# Patient Record
Sex: FEMALE | Employment: UNEMPLOYED | URBAN - METROPOLITAN AREA
[De-identification: names, ages, dates, MRNs, and addresses within clinical notes are randomized per-mention and may not be internally consistent; named-entity substitution may affect disease eponyms.]

---

## 2021-05-18 ENCOUNTER — OFFICE VISIT (OUTPATIENT)
Dept: GASTROENTEROLOGY | Age: 51
End: 2021-05-18
Payer: COMMERCIAL

## 2021-05-18 VITALS
OXYGEN SATURATION: 98 % | HEART RATE: 85 BPM | DIASTOLIC BLOOD PRESSURE: 70 MMHG | TEMPERATURE: 97.1 F | BODY MASS INDEX: 28 KG/M2 | SYSTOLIC BLOOD PRESSURE: 106 MMHG | HEIGHT: 63 IN | RESPIRATION RATE: 12 BRPM | WEIGHT: 158 LBS

## 2021-05-18 DIAGNOSIS — R10.9 ABDOMINAL PAIN, UNSPECIFIED ABDOMINAL LOCATION: Primary | ICD-10-CM

## 2021-05-18 PROCEDURE — 99204 OFFICE O/P NEW MOD 45 MIN: CPT | Performed by: INTERNAL MEDICINE

## 2021-05-18 RX ORDER — DIVALPROEX SODIUM 500 MG/1
TABLET, DELAYED RELEASE ORAL
COMMUNITY
Start: 2021-04-27

## 2021-05-18 RX ORDER — CYCLOBENZAPRINE HCL 10 MG
TABLET ORAL
COMMUNITY
Start: 2021-05-14

## 2021-05-18 RX ORDER — BUPROPION HYDROCHLORIDE 150 MG/1
TABLET ORAL
COMMUNITY
Start: 2021-04-27

## 2021-05-18 RX ORDER — SODIUM, POTASSIUM,MAG SULFATES 17.5-3.13G
SOLUTION, RECONSTITUTED, ORAL ORAL
Qty: 1 BOTTLE | Refills: 0 | Status: SHIPPED | OUTPATIENT
Start: 2021-05-18 | End: 2021-07-20

## 2021-05-18 RX ORDER — LISINOPRIL 5 MG/1
TABLET ORAL
COMMUNITY
Start: 2021-05-11

## 2021-05-18 RX ORDER — POLYETHYLENE GLYCOL 3350 17 G/17G
17 POWDER, FOR SOLUTION ORAL DAILY
Qty: 510 G | Refills: 3 | Status: SHIPPED | OUTPATIENT
Start: 2021-05-18 | End: 2021-06-17

## 2021-05-18 RX ORDER — GABAPENTIN 800 MG/1
TABLET ORAL
COMMUNITY
Start: 2021-04-24

## 2021-05-18 ASSESSMENT — ENCOUNTER SYMPTOMS
ABDOMINAL PAIN: 1
CHEST TIGHTNESS: 0
COLOR CHANGE: 0
PHOTOPHOBIA: 0
VOICE CHANGE: 0
RECTAL PAIN: 0
EYE PAIN: 0
BLOOD IN STOOL: 0
TROUBLE SWALLOWING: 0
SHORTNESS OF BREATH: 0
VOMITING: 0
CONSTIPATION: 1
ABDOMINAL DISTENTION: 0
DIARRHEA: 0
NAUSEA: 0
EYE REDNESS: 0
WHEEZING: 0

## 2021-05-18 NOTE — PROGRESS NOTES
Subjective:      Patient ID: Liu Pearson is a 45 y.o. female who presents today for:  Chief Complaint   Patient presents with    Abdominal Pain    Constipation       HPI  Is very pleasant Armenian-speaking female coming in today for the initial visit. Patient mentioned that she has been having constipation for a long time associated with bloating and fullness. Denies previous colonoscopy. Reports family history of CRC though not clear. Denies any hematemesis melena hematochezia. No weight loss reported. Patient also reports family history of cirrhosis in sister mother and other family members does not recall the etiology of cirrhosis. Patient mentioned that alcohol was not involved. Does not recall if there were history of viral hepatitis. Otherwise given the longstanding history of constipation associated abdominal pain and the family history of liver diseases patient came in today for further evaluation management  No past medical history on file. No past surgical history on file. Social History     Socioeconomic History    Marital status: Unknown     Spouse name: Not on file    Number of children: Not on file    Years of education: Not on file    Highest education level: Not on file   Occupational History    Not on file   Tobacco Use    Smoking status: Never Smoker    Smokeless tobacco: Never Used   Substance and Sexual Activity    Alcohol use: Not on file    Drug use: Not on file    Sexual activity: Not on file   Other Topics Concern    Not on file   Social History Narrative    Not on file     Social Determinants of Health     Financial Resource Strain:     Difficulty of Paying Living Expenses:    Food Insecurity:     Worried About Running Out of Food in the Last Year:     [address] in the Last Year:    Transportation Needs:     Lack of Transportation (Medical):      Lack of Transportation (Non-Medical):    Physical Activity:     Days of Exercise per Week:     Minutes of Exercise per Session:    Stress:     Feeling of Stress :    Social Connections:     Frequency of Communication with Friends and Family:     Frequency of Social Gatherings with Friends and Family:     Attends [employer] Services:     Active Member of Clubs or Organizations:     Attends Club or Organization Meetings:     Marital Status:    Intimate Partner Violence:     Fear of Current or Ex-Partner:     Emotionally Abused:     Physically Abused:     Sexually Abused:      No family history on file. No Known Allergies      Review of Systems   Constitutional: Negative for appetite change, chills, fatigue, fever and unexpected weight change. HENT: Negative for nosebleeds, tinnitus, trouble swallowing and voice change. Eyes: Negative for photophobia, pain and redness. Respiratory: Negative for chest tightness, shortness of breath and wheezing. Cardiovascular: Negative for chest pain, palpitations and leg swelling. Gastrointestinal: Positive for abdominal pain and constipation. Negative for abdominal distention, blood in stool, diarrhea, nausea, rectal pain and vomiting. Endocrine: Negative for polydipsia, polyphagia and polyuria. Genitourinary: Negative for difficulty urinating and hematuria. Skin: Negative for color change, pallor and rash. Neurological: Negative for dizziness, speech difficulty and headaches. Psychiatric/Behavioral: Negative for confusion and suicidal ideas. Objective:   /70 (Site: Right Upper Arm, Position: Sitting, Cuff Size: Small Adult)   Pulse 85   Temp 97.1 °F (36.2 °C)   Resp 12   Ht 5' 3\" (1.6 m)   Wt 158 lb (71.7 kg)   SpO2 98%   BMI 27.99 kg/m²     Physical Exam  Constitutional:       General: She is not in acute distress. Appearance: She is well-developed. HENT:      Head: Normocephalic and atraumatic. Eyes:      Conjunctiva/sclera: Conjunctivae normal.      Pupils: Pupils are equal, round, and reactive to light.    Cardiovascular:

## 2021-05-20 DIAGNOSIS — R10.9 ABDOMINAL PAIN, UNSPECIFIED ABDOMINAL LOCATION: ICD-10-CM

## 2021-05-20 LAB
ALBUMIN SERPL-MCNC: 4.1 G/DL (ref 3.5–4.6)
ALP BLD-CCNC: 87 U/L (ref 40–130)
ALT SERPL-CCNC: 12 U/L (ref 0–33)
ANION GAP SERPL CALCULATED.3IONS-SCNC: 12 MEQ/L (ref 9–15)
AST SERPL-CCNC: 14 U/L (ref 0–35)
BILIRUB SERPL-MCNC: <0.2 MG/DL (ref 0.2–0.7)
BUN BLDV-MCNC: 13 MG/DL (ref 6–20)
CALCIUM SERPL-MCNC: 9.3 MG/DL (ref 8.5–9.9)
CHLORIDE BLD-SCNC: 103 MEQ/L (ref 95–107)
CO2: 27 MEQ/L (ref 20–31)
CREAT SERPL-MCNC: 0.82 MG/DL (ref 0.5–0.9)
GFR AFRICAN AMERICAN: >60
GFR NON-AFRICAN AMERICAN: >60
GLOBULIN: 2.1 G/DL (ref 2.3–3.5)
GLUCOSE BLD-MCNC: 92 MG/DL (ref 70–99)
HBV SURFACE AB TITR SER: NORMAL MIU/ML
HCT VFR BLD CALC: 38.5 % (ref 37–47)
HEMOGLOBIN: 13 G/DL (ref 12–16)
HEPATITIS B SURFACE ANTIGEN INTERPRETATION: NORMAL
HEPATITIS C ANTIBODY INTERPRETATION: NORMAL
INR BLD: 1
MCH RBC QN AUTO: 32.1 PG (ref 27–31.3)
MCHC RBC AUTO-ENTMCNC: 33.7 % (ref 33–37)
MCV RBC AUTO: 95.5 FL (ref 82–100)
PDW BLD-RTO: 13.1 % (ref 11.5–14.5)
PLATELET # BLD: 184 K/UL (ref 130–400)
POTASSIUM SERPL-SCNC: 4.7 MEQ/L (ref 3.4–4.9)
PROTHROMBIN TIME: 13.4 SEC (ref 12.3–14.9)
RBC # BLD: 4.04 M/UL (ref 4.2–5.4)
SODIUM BLD-SCNC: 142 MEQ/L (ref 135–144)
TOTAL PROTEIN: 6.2 G/DL (ref 6.3–8)
WBC # BLD: 5.6 K/UL (ref 4.8–10.8)

## 2021-05-22 LAB
HAV AB SERPL IA-ACNC: NEGATIVE
HEPATITIS B CORE TOTAL ANTIBODY: NEGATIVE

## 2021-05-24 ENCOUNTER — TELEPHONE (OUTPATIENT)
Dept: GASTROENTEROLOGY | Age: 51
End: 2021-05-24

## 2021-05-24 ENCOUNTER — ANESTHESIA EVENT (OUTPATIENT)
Dept: OPERATING ROOM | Age: 51
End: 2021-05-24
Payer: COMMERCIAL

## 2021-05-24 NOTE — TELEPHONE ENCOUNTER
Patient has called back stating they called the pharmacy and now they pharmacy is telling them the doctor needs to call the insurance company? Can you call them back to get this resolved? The procedure is scheduled for tomorrow.

## 2021-05-25 ENCOUNTER — ANESTHESIA (OUTPATIENT)
Dept: OPERATING ROOM | Age: 51
End: 2021-05-25
Payer: COMMERCIAL

## 2021-05-25 ENCOUNTER — ANCILLARY PROCEDURE (OUTPATIENT)
Dept: ENDOSCOPY | Age: 51
End: 2021-05-25
Attending: INTERNAL MEDICINE
Payer: COMMERCIAL

## 2021-05-25 ENCOUNTER — HOSPITAL ENCOUNTER (OUTPATIENT)
Age: 51
Setting detail: OUTPATIENT SURGERY
Discharge: HOME | End: 2021-05-25
Attending: INTERNAL MEDICINE | Admitting: INTERNAL MEDICINE
Payer: COMMERCIAL

## 2021-05-25 VITALS
DIASTOLIC BLOOD PRESSURE: 75 MMHG | HEART RATE: 81 BPM | BODY MASS INDEX: 26.75 KG/M2 | OXYGEN SATURATION: 96 % | RESPIRATION RATE: 16 BRPM | TEMPERATURE: 97.5 F | WEIGHT: 151 LBS | HEIGHT: 63 IN | SYSTOLIC BLOOD PRESSURE: 129 MMHG

## 2021-05-25 PROCEDURE — 2580000003 HC RX 258: Performed by: INTERNAL MEDICINE

## 2021-05-25 PROCEDURE — 6370000000 HC RX 637 (ALT 250 FOR IP): Performed by: INTERNAL MEDICINE

## 2021-05-25 RX ORDER — MAGNESIUM HYDROXIDE 1200 MG/15ML
LIQUID ORAL PRN
Status: DISCONTINUED | OUTPATIENT
Start: 2021-05-25 | End: 2021-05-25 | Stop reason: ALTCHOICE

## 2021-05-25 RX ORDER — SODIUM CHLORIDE 9 MG/ML
INJECTION, SOLUTION INTRAVENOUS CONTINUOUS
Status: CANCELLED | OUTPATIENT
Start: 2021-05-25

## 2021-05-25 RX ORDER — SODIUM CHLORIDE 9 MG/ML
25 INJECTION, SOLUTION INTRAVENOUS PRN
Status: CANCELLED | OUTPATIENT
Start: 2021-05-25

## 2021-05-25 RX ORDER — 0.9 % SODIUM CHLORIDE 0.9 %
500 INTRAVENOUS SOLUTION INTRAVENOUS ONCE
Status: COMPLETED | OUTPATIENT
Start: 2021-05-25 | End: 2021-05-25

## 2021-05-25 RX ORDER — LIDOCAINE HYDROCHLORIDE 10 MG/ML
1 INJECTION, SOLUTION EPIDURAL; INFILTRATION; INTRACAUDAL; PERINEURAL
Status: CANCELLED | OUTPATIENT
Start: 2021-05-25 | End: 2021-05-25

## 2021-05-25 RX ORDER — SODIUM CHLORIDE 9 MG/ML
INJECTION, SOLUTION INTRAVENOUS
Status: DISCONTINUED
Start: 2021-05-25 | End: 2021-05-25 | Stop reason: HOSPADM

## 2021-05-25 RX ORDER — SODIUM CHLORIDE 0.9 % (FLUSH) 0.9 %
5-40 SYRINGE (ML) INJECTION EVERY 12 HOURS SCHEDULED
Status: CANCELLED | OUTPATIENT
Start: 2021-05-25

## 2021-05-25 RX ORDER — ONDANSETRON 2 MG/ML
4 INJECTION INTRAMUSCULAR; INTRAVENOUS
Status: DISCONTINUED | OUTPATIENT
Start: 2021-05-25 | End: 2021-05-25 | Stop reason: HOSPADM

## 2021-05-25 RX ORDER — SODIUM CHLORIDE 0.9 % (FLUSH) 0.9 %
5-40 SYRINGE (ML) INJECTION PRN
Status: CANCELLED | OUTPATIENT
Start: 2021-05-25

## 2021-05-25 RX ADMIN — SODIUM CHLORIDE 500 ML: 9 INJECTION, SOLUTION INTRAVENOUS at 08:01

## 2021-05-25 ASSESSMENT — PAIN - FUNCTIONAL ASSESSMENT: PAIN_FUNCTIONAL_ASSESSMENT: 0-10

## 2021-05-25 NOTE — H&P
Patient Name: Ernestina Vail  : 1970  MRN: [id number]  DATE: 21      ENDOSCOPY  History and Physical    Procedure:    [x] Diagnostic Colonoscopy       [] Screening Colonoscopy  [] EGD      [] ERCP      [] EUS       [] Other    [x] Previous office notes/History and Physical reviewed from the patients chart. Please see EMR for further details of HPI. I have examined the patient's status immediately prior to the procedure and:      Indications/HPI:    []Abdominal Pain   []Cancer- GI/Lung  []Fhx of colon CA/polyps  []History of Polyps   []Carltons   []Melena  []Abnormal Imaging   []Dysphagia    []Persistent Pneumonia  []Anemia   []Food Impaction  []History of Polyps  []GI Bleed   []Pulmonary nodule/Mass  []Change in bowel habits  []Heartburn/Reflux  []Rectal Bleed (BRBPR)  []Chest Pain - Non Cardiac  []Heme (+) Stool  []Ulcers  [x]Constipation   []Hemoptysis   []Varices  []Diarrhea   []Hypoxemia  []Nausea/Vomiting   []Screening   []Crohns/Colitis  []Other:    Anesthesia:   [x] MAC [] Moderate Sedation   [] General   [] None     ROS: 12 pt Review of Symptoms was negative unless mentioned above    Medications:   Prior to Admission medications    Medication Sig Start Date End Date Taking?  Authorizing Provider   cyclobenzaprine (FLEXERIL) 10 MG tablet  21   Historical Provider, MD   lisinopril (PRINIVIL;ZESTRIL) 5 MG tablet  21   Historical Provider, MD   gabapentin (NEURONTIN) 800 MG tablet  21   Historical Provider, MD   divalproex (DEPAKOTE) 500 MG DR tablet  21   Historical Provider, MD   buPROPion (WELLBUTRIN XL) 150 MG extended release tablet  21   Historical Provider, MD   Na Sulfate-K Sulfate-Mg Sulf 17.5-3.13-1.6 GM/177ML SOLN As directed 21   Enrique Roa MD   polyethylene glycol (MIRALAX) 17 GM/SCOOP powder Take 17 g by mouth daily 21  Enrique Roa MD       Allergies: No Known Allergies     History of allergic reaction to anesthesia: No    Past Medical History:  No past medical history on file. Past Surgical History:  No past surgical history on file. Social History:  Social History     Tobacco Use    Smoking status: Never Smoker    Smokeless tobacco: Never Used   Substance Use Topics    Alcohol use: Not on file    Drug use: Not on file       Vital Signs: There were no vitals filed for this visit. Physical Exam:  Cardiac:  [x]WNL  []Comments:  Pulmonary:  [x]WNL   []Comments:   Neuro/Mental Status:  [x]WNL  []Comments:  Abdominal:  [x]WNL    []Comments:  Other:   []WNL  []Comments:    Informed Consent:  The risks and benefits of the procedure have been discussed with either the patient or if they cannot consent, their representative. Assessment:  Patient examined and appropriate for planned sedation and procedure. Plan:  Proceed with planned sedation and procedure as above.     Kathryn Rodriguez MD  7:23 AM

## 2021-06-07 RX ORDER — POLYETHYLENE GLYCOL 3350, SODIUM CHLORIDE, SODIUM BICARBONATE, POTASSIUM CHLORIDE 420; 11.2; 5.72; 1.48 G/4L; G/4L; G/4L; G/4L
4000 POWDER, FOR SOLUTION ORAL ONCE
Qty: 4000 ML | Refills: 0 | Status: SHIPPED | OUTPATIENT
Start: 2021-06-07 | End: 2021-06-07

## 2021-06-08 ENCOUNTER — ANESTHESIA (OUTPATIENT)
Dept: ENDOSCOPY | Age: 51
End: 2021-06-08
Payer: COMMERCIAL

## 2021-06-08 ENCOUNTER — ANESTHESIA EVENT (OUTPATIENT)
Dept: ENDOSCOPY | Age: 51
End: 2021-06-08
Payer: COMMERCIAL

## 2021-06-08 ENCOUNTER — ANCILLARY PROCEDURE (OUTPATIENT)
Dept: ENDOSCOPY | Age: 51
End: 2021-06-08
Attending: INTERNAL MEDICINE
Payer: COMMERCIAL

## 2021-06-08 ENCOUNTER — HOSPITAL ENCOUNTER (OUTPATIENT)
Age: 51
Setting detail: OUTPATIENT SURGERY
Discharge: HOME | End: 2021-06-08
Attending: INTERNAL MEDICINE | Admitting: INTERNAL MEDICINE
Payer: COMMERCIAL

## 2021-06-08 VITALS
DIASTOLIC BLOOD PRESSURE: 63 MMHG | HEART RATE: 80 BPM | WEIGHT: 151 LBS | SYSTOLIC BLOOD PRESSURE: 107 MMHG | TEMPERATURE: 96.9 F | OXYGEN SATURATION: 97 % | HEIGHT: 64 IN | BODY MASS INDEX: 25.78 KG/M2 | RESPIRATION RATE: 18 BRPM

## 2021-06-08 VITALS
OXYGEN SATURATION: 100 % | RESPIRATION RATE: 12 BRPM | DIASTOLIC BLOOD PRESSURE: 54 MMHG | SYSTOLIC BLOOD PRESSURE: 88 MMHG

## 2021-06-08 LAB
AMPHETAMINE SCREEN, URINE: NORMAL
BARBITURATE SCREEN URINE: NORMAL
BENZODIAZEPINE SCREEN, URINE: NORMAL
CANNABINOID SCREEN URINE: NORMAL
COCAINE METABOLITE SCREEN URINE: NORMAL
Lab: NORMAL
METHADONE SCREEN, URINE: NORMAL
OPIATE SCREEN URINE: NORMAL
OXYCODONE URINE: NORMAL
PHENCYCLIDINE SCREEN URINE: NORMAL
PROPOXYPHENE SCREEN: NORMAL

## 2021-06-08 PROCEDURE — 2709999900 HC NON-CHARGEABLE SUPPLY: Performed by: INTERNAL MEDICINE

## 2021-06-08 PROCEDURE — 6360000002 HC RX W HCPCS: Performed by: NURSE ANESTHETIST, CERTIFIED REGISTERED

## 2021-06-08 PROCEDURE — 7100000010 HC PHASE II RECOVERY - FIRST 15 MIN: Performed by: INTERNAL MEDICINE

## 2021-06-08 PROCEDURE — 80307 DRUG TEST PRSMV CHEM ANLYZR: CPT

## 2021-06-08 PROCEDURE — 2580000003 HC RX 258

## 2021-06-08 PROCEDURE — 2500000003 HC RX 250 WO HCPCS: Performed by: NURSE ANESTHETIST, CERTIFIED REGISTERED

## 2021-06-08 PROCEDURE — 2580000003 HC RX 258: Performed by: INTERNAL MEDICINE

## 2021-06-08 PROCEDURE — 3700000001 HC ADD 15 MINUTES (ANESTHESIA): Performed by: INTERNAL MEDICINE

## 2021-06-08 PROCEDURE — 3700000000 HC ANESTHESIA ATTENDED CARE: Performed by: INTERNAL MEDICINE

## 2021-06-08 PROCEDURE — 3609027000 HC COLONOSCOPY: Performed by: INTERNAL MEDICINE

## 2021-06-08 PROCEDURE — 6370000000 HC RX 637 (ALT 250 FOR IP): Performed by: INTERNAL MEDICINE

## 2021-06-08 RX ORDER — RISPERIDONE 0.5 MG/1
0.5 TABLET, FILM COATED ORAL 2 TIMES DAILY
COMMUNITY

## 2021-06-08 RX ORDER — LIDOCAINE HYDROCHLORIDE 20 MG/ML
INJECTION, SOLUTION INFILTRATION; PERINEURAL PRN
Status: DISCONTINUED | OUTPATIENT
Start: 2021-06-08 | End: 2021-06-08 | Stop reason: SDUPTHER

## 2021-06-08 RX ORDER — SODIUM CHLORIDE 9 MG/ML
INJECTION, SOLUTION INTRAVENOUS
Status: DISCONTINUED
Start: 2021-06-08 | End: 2021-06-08 | Stop reason: HOSPADM

## 2021-06-08 RX ORDER — MAGNESIUM HYDROXIDE 1200 MG/15ML
LIQUID ORAL PRN
Status: DISCONTINUED | OUTPATIENT
Start: 2021-06-08 | End: 2021-06-08 | Stop reason: ALTCHOICE

## 2021-06-08 RX ORDER — 0.9 % SODIUM CHLORIDE 0.9 %
500 INTRAVENOUS SOLUTION INTRAVENOUS ONCE
Status: DISCONTINUED | OUTPATIENT
Start: 2021-06-08 | End: 2021-06-08 | Stop reason: HOSPADM

## 2021-06-08 RX ORDER — PROPOFOL 10 MG/ML
INJECTION, EMULSION INTRAVENOUS PRN
Status: DISCONTINUED | OUTPATIENT
Start: 2021-06-08 | End: 2021-06-08 | Stop reason: SDUPTHER

## 2021-06-08 RX ORDER — TRAZODONE HYDROCHLORIDE 50 MG/1
50 TABLET ORAL NIGHTLY
COMMUNITY

## 2021-06-08 RX ADMIN — LIDOCAINE HYDROCHLORIDE 60 MG: 20 INJECTION, SOLUTION INFILTRATION; PERINEURAL at 07:45

## 2021-06-08 RX ADMIN — Medication 500 ML: at 07:39

## 2021-06-08 RX ADMIN — PROPOFOL 400 MG: 10 INJECTION, EMULSION INTRAVENOUS at 07:45

## 2021-06-08 RX ADMIN — SODIUM CHLORIDE 500 ML: 9 INJECTION, SOLUTION INTRAVENOUS at 07:39

## 2021-06-08 ASSESSMENT — PULMONARY FUNCTION TESTS
PIF_VALUE: 1
PIF_VALUE: 0
PIF_VALUE: 1
PIF_VALUE: 1
PIF_VALUE: 0
PIF_VALUE: 1

## 2021-06-08 ASSESSMENT — PAIN - FUNCTIONAL ASSESSMENT: PAIN_FUNCTIONAL_ASSESSMENT: 0-10

## 2021-06-08 NOTE — H&P
Patient Name: Katia Martel  : 1970  MRN: [id number]  DATE: 21      ENDOSCOPY  History and Physical    Procedure:    [x] Diagnostic Colonoscopy       [] Screening Colonoscopy  [] EGD      [] ERCP      [] EUS       [] Other    [x] Previous office notes/History and Physical reviewed from the patients chart. Please see EMR for further details of HPI. I have examined the patient's status immediately prior to the procedure and:      Indications/HPI:    [x]Abdominal Pain   []Cancer- GI/Lung  []Fhx of colon CA/polyps  []History of Polyps   []Carltons   []Melena  []Abnormal Imaging   []Dysphagia    []Persistent Pneumonia  []Anemia   []Food Impaction  []History of Polyps  []GI Bleed   []Pulmonary nodule/Mass  []Change in bowel habits  []Heartburn/Reflux  []Rectal Bleed (BRBPR)  []Chest Pain - Non Cardiac  []Heme (+) Stool  []Ulcers  [x]Constipation   []Hemoptysis   []Varices  []Diarrhea   []Hypoxemia  []Nausea/Vomiting   []Screening   []Crohns/Colitis  []Other:    Anesthesia:   [x] MAC [] Moderate Sedation   [] General   [] None     ROS: 12 pt Review of Symptoms was negative unless mentioned above    Medications:   Prior to Admission medications    Medication Sig Start Date End Date Taking?  Authorizing Provider   cyclobenzaprine (FLEXERIL) 10 MG tablet  21   Historical Provider, MD   lisinopril (PRINIVIL;ZESTRIL) 5 MG tablet  21   Historical Provider, MD   gabapentin (NEURONTIN) 800 MG tablet  21   Historical Provider, MD   divalproex (DEPAKOTE) 500 MG DR tablet  21   Historical Provider, MD   buPROPion (WELLBUTRIN XL) 150 MG extended release tablet  21   Historical Provider, MD   Na Sulfate-K Sulfate-Mg Sulf 17.5-3.13-1.6 GM/177ML SOLN As directed 21   Connor Fatima MD   polyethylene glycol (MIRALAX) 17 GM/SCOOP powder Take 17 g by mouth daily 21  Connor Fatima MD       Allergies: No Known Allergies     History of allergic reaction to anesthesia: No    Past Medical History:  Past Medical History:   Diagnosis Date    Cough     has a history of cough and was given inhaler    Hypertension        Past Surgical History:  Past Surgical History:   Procedure Laterality Date    CARPAL TUNNEL RELEASE Bilateral     HYSTERECTOMY      TUBAL LIGATION         Social History:  Social History     Tobacco Use    Smoking status: Never Smoker    Smokeless tobacco: Never Used   Substance Use Topics    Alcohol use: Not on file    Drug use: Yes     Types: Cocaine     Comment: last use sunday 5/23/21       Vital Signs: There were no vitals filed for this visit. Physical Exam:  Cardiac:  [x]WNL  []Comments:  Pulmonary:  [x]WNL   []Comments:   Neuro/Mental Status:  [x]WNL  []Comments:  Abdominal:  [x]WNL    []Comments:  Other:   []WNL  []Comments:    Informed Consent:  The risks and benefits of the procedure have been discussed with either the patient or if they cannot consent, their representative. Assessment:  Patient examined and appropriate for planned sedation and procedure. Plan:  Proceed with planned sedation and procedure as above.     Stan Mitchell MD  7:07 AM

## 2021-06-08 NOTE — ANESTHESIA PRE PROCEDURE
Department of Anesthesiology  Preprocedure Note       Name:  Liu Pearson   Age:  45 y.o.  :  1970                                          MRN:  [id number]         Date:  2021      Surgeon: Olivier Ponce):  Deuce Mccullough MD    Procedure: Procedure(s):  COLONOSCOPY DIAGNOSTIC    Medications prior to admission:   Prior to Admission medications    Medication Sig Start Date End Date Taking? Authorizing Provider   cyclobenzaprine (FLEXERIL) 10 MG tablet  21   Historical Provider, MD   lisinopril (PRINIVIL;ZESTRIL) 5 MG tablet  21   Historical Provider, MD   gabapentin (NEURONTIN) 800 MG tablet  21   Historical Provider, MD   divalproex (DEPAKOTE) 500 MG DR tablet  21   Historical Provider, MD   buPROPion (WELLBUTRIN XL) 150 MG extended release tablet  21   Historical Provider, MD   Na Sulfate-K Sulfate-Mg Sulf 17.5-3.13-1.6 GM/177ML SOLN As directed 21   Deuce Mccullough MD   polyethylene glycol (MIRALAX) 17 GM/SCOOP powder Take 17 g by mouth daily 21  Deuce Mccullough MD       Current medications:    Current Facility-Administered Medications   Medication Dose Route Frequency Provider Last Rate Last Admin    sodium chloride 0.9 % infusion                Allergies:  No Known Allergies    Problem List:    Patient Active Problem List   Diagnosis Code    Abdominal pain R10.9       Past Medical History:        Diagnosis Date    Cough     has a history of cough and was given inhaler    Hypertension        Past Surgical History:        Procedure Laterality Date    CARPAL TUNNEL RELEASE Bilateral     HYSTERECTOMY      TUBAL LIGATION         Social History:    Social History     Tobacco Use    Smoking status: Never Smoker    Smokeless tobacco: Never Used   Substance Use Topics    Alcohol use: Not on file                                Counseling given: Not Answered      Vital Signs (Current): There were no vitals filed for this visit. BP Readings from Last 3 Encounters:   05/25/21 129/75   05/18/21 106/70       NPO Status:                                                                                 BMI:   Wt Readings from Last 3 Encounters:   05/25/21 151 lb (68.5 kg)   05/18/21 158 lb (71.7 kg)     There is no height or weight on file to calculate BMI.    CBC:   Lab Results   Component Value Date    WBC 5.6 05/20/2021    RBC 4.04 05/20/2021    HGB 13.0 05/20/2021    HCT 38.5 05/20/2021    MCV 95.5 05/20/2021    RDW 13.1 05/20/2021     05/20/2021       CMP:   Lab Results   Component Value Date     05/20/2021    K 4.7 05/20/2021     05/20/2021    CO2 27 05/20/2021    BUN 13 05/20/2021    CREATININE 0.82 05/20/2021    GFRAA >60.0 05/20/2021    LABGLOM >60.0 05/20/2021    GLUCOSE 92 05/20/2021    PROT 6.2 05/20/2021    CALCIUM 9.3 05/20/2021    BILITOT <0.2 05/20/2021    ALKPHOS 87 05/20/2021    AST 14 05/20/2021    ALT 12 05/20/2021       POC Tests: No results for input(s): POCGLU, POCNA, POCK, POCCL, POCBUN, POCHEMO, POCHCT in the last 72 hours.     Coags:   Lab Results   Component Value Date    PROTIME 13.4 05/20/2021    INR 1.0 05/20/2021       HCG (If Applicable): No results found for: PREGTESTUR, PREGSERUM, HCG, HCGQUANT     ABGs: No results found for: PHART, PO2ART, UZU5XJE, VBV7RJQ, BEART, Z1LVDDKL     Type & Screen (If Applicable):  No results found for: LABABO, LABRH    Drug/Infectious Status (If Applicable):  No results found for: HIV, HEPCAB    COVID-19 Screening (If Applicable): No results found for: COVID19        Anesthesia Evaluation    Airway: Mallampati: II  TM distance: >3 FB   Neck ROM: full  Mouth opening: > = 3 FB Dental:          Pulmonary:Negative Pulmonary ROS and normal exam                               Cardiovascular:    (+) hypertension:,         Rhythm: regular  Rate: normal                    Neuro/Psych:   (+) psychiatric history:            GI/Hepatic/Renal:   (+) bowel prep, ROS comment: Cocaine abuse. Endo/Other: Negative Endo/Other ROS                    Abdominal:           Vascular: negative vascular ROS. Anesthesia Plan      MAC     ASA 2       Induction: intravenous. Anesthetic plan and risks discussed with patient. Plan discussed with attending.                   CARMINA Browne - SERGEY   6/8/2021

## 2021-06-10 ENCOUNTER — OFFICE VISIT (OUTPATIENT)
Dept: GASTROENTEROLOGY | Age: 51
End: 2021-06-10
Payer: COMMERCIAL

## 2021-06-10 VITALS
HEART RATE: 104 BPM | WEIGHT: 153 LBS | BODY MASS INDEX: 26.26 KG/M2 | SYSTOLIC BLOOD PRESSURE: 112 MMHG | TEMPERATURE: 98 F | OXYGEN SATURATION: 98 % | DIASTOLIC BLOOD PRESSURE: 70 MMHG | RESPIRATION RATE: 12 BRPM

## 2021-06-10 DIAGNOSIS — K21.9 GASTROESOPHAGEAL REFLUX DISEASE, UNSPECIFIED WHETHER ESOPHAGITIS PRESENT: Primary | ICD-10-CM

## 2021-06-10 DIAGNOSIS — K57.90 DIVERTICULOSIS: ICD-10-CM

## 2021-06-10 DIAGNOSIS — Z83.79 FAMILY HISTORY OF CIRRHOSIS OF LIVER: ICD-10-CM

## 2021-06-10 DIAGNOSIS — R10.11 RUQ PAIN: ICD-10-CM

## 2021-06-10 DIAGNOSIS — K59.00 CONSTIPATION, UNSPECIFIED CONSTIPATION TYPE: ICD-10-CM

## 2021-06-10 PROCEDURE — G8427 DOCREV CUR MEDS BY ELIG CLIN: HCPCS | Performed by: NURSE PRACTITIONER

## 2021-06-10 PROCEDURE — 3017F COLORECTAL CA SCREEN DOC REV: CPT | Performed by: NURSE PRACTITIONER

## 2021-06-10 PROCEDURE — G8419 CALC BMI OUT NRM PARAM NOF/U: HCPCS | Performed by: NURSE PRACTITIONER

## 2021-06-10 PROCEDURE — 1036F TOBACCO NON-USER: CPT | Performed by: NURSE PRACTITIONER

## 2021-06-10 PROCEDURE — 99214 OFFICE O/P EST MOD 30 MIN: CPT | Performed by: NURSE PRACTITIONER

## 2021-06-10 RX ORDER — OMEPRAZOLE 40 MG/1
CAPSULE, DELAYED RELEASE ORAL
COMMUNITY
Start: 2021-04-28 | End: 2021-06-10 | Stop reason: SDUPTHER

## 2021-06-10 RX ORDER — OMEPRAZOLE 40 MG/1
40 CAPSULE, DELAYED RELEASE ORAL
Qty: 90 CAPSULE | Refills: 1 | Status: SHIPPED | OUTPATIENT
Start: 2021-06-10

## 2021-06-10 ASSESSMENT — ENCOUNTER SYMPTOMS
PHOTOPHOBIA: 0
VOICE CHANGE: 0
NAUSEA: 0
TROUBLE SWALLOWING: 0
ABDOMINAL PAIN: 1
EYE PAIN: 0
SHORTNESS OF BREATH: 0
EYE REDNESS: 0
ANAL BLEEDING: 0
CONSTIPATION: 0
COLOR CHANGE: 0
WHEEZING: 0
BLOOD IN STOOL: 0
CHEST TIGHTNESS: 0
VOMITING: 0
RECTAL PAIN: 0
DIARRHEA: 0
ABDOMINAL DISTENTION: 0

## 2021-06-10 NOTE — PROGRESS NOTES
Subjective:      Patient ID: Ernestina Vail is a 45 y.o. female who presents today for:  Chief Complaint   Patient presents with    Follow-up     Patient report having acid in acid in stomach when she eats food     Abdominal Pain       HPI   Patient came in as follow-up to colonoscopy, her primary language is Chadian and  was used for this visit. Underwent colonoscopy for constipation bloating and abdominal fullness, colonoscopy was notable for left colon diverticulosis, small external hemorrhoids and moderately tortuous colon, results discussed at length with patient. Since her colonoscopy patient's had 1 bowel movement, has persistent bloating and abdominal fullness and complaints of epigastric pain. States that her acid reflux has been severe for the past 4 months, was recently initiated on Prilosec for no improvement. No history of EGD. Of note patient also has a family history of cirrhosis with unknown etiology. Additional work-up included negative viral hepatitis studies, CMP was notable for normal liver enzymes, CBC noted normal platelets. Endoscopic hx:  Colonoscopy 6/8/21 Dr Finesse Anguiano  Left colon diverticulosis  Small external hemorrhoids  Moderately tortuous colon mainly on the sigmoid colon  Background  Is very pleasant Chadian-speaking female coming in today for the initial visit. Patient mentioned that she has been having constipation for a long time associated with bloating and fullness. Denies previous colonoscopy. Reports family history of CRC though not clear. Denies any hematemesis melena hematochezia. No weight loss reported. Patient also reports family history of cirrhosis in sister mother and other family members does not recall the etiology of cirrhosis. Patient mentioned that alcohol was not involved. Does not recall if there were history of viral hepatitis.   Otherwise given the longstanding history of constipation associated abdominal pain and the family history of liver diseases patient came in today for further evaluation management    Past Medical History:   Diagnosis Date    Cough     has a history of cough and was given inhaler    Hypertension      Past Surgical History:   Procedure Laterality Date    CARPAL TUNNEL RELEASE Bilateral     COLONOSCOPY N/A 6/8/2021    COLONOSCOPY DIAGNOSTIC performed by Sarthak Dooley MD at [address] [place] [address]       Social History     Socioeconomic History    Marital status: Unknown     Spouse name: Not on file    Number of children: Not on file    Years of education: Not on file    Highest education level: Not on file   Occupational History    Not on file   Tobacco Use    Smoking status: Never Smoker    Smokeless tobacco: Never Used   Substance and Sexual Activity    Alcohol use: Not Currently    Drug use: Yes     Comment: last use sunday 5/23/21    Sexual activity: Not on file   Other Topics Concern    Not on file   Social History Narrative    Not on file     Social Determinants of Health     Financial Resource Strain:     Difficulty of Paying Living Expenses:    Food Insecurity:     Worried About [address] in the Last Year:     [address] in the Last Year:    Transportation Needs:     Lack of Transportation (Medical):      Lack of Transportation (Non-Medical):    Physical Activity:     Days of Exercise per Week:     Minutes of Exercise per Session:    Stress:     Feeling of Stress :    Social Connections:     Frequency of Communication with Friends and Family:     Frequency of Social Gatherings with Friends and Family:     Attends Taoism Services:     Active Member of Clubs or Organizations:     Attends Club or Organization Meetings:     Marital Status:    Intimate Partner Violence:     Fear of Current or Ex-Partner:     Emotionally Abused:     Physically Abused:     Sexually Abused:      Family History   Problem Relation Age of Onset    Colon Cancer Brother      No Known Allergies      Review of Systems   Constitutional: Negative for appetite change, chills, fever and unexpected weight change. HENT: Negative for nosebleeds, tinnitus, trouble swallowing and voice change. Eyes: Negative for photophobia, pain and redness. Respiratory: Negative for chest tightness, shortness of breath and wheezing. Cardiovascular: Negative for chest pain, palpitations and leg swelling. Gastrointestinal: Positive for abdominal pain (gerd). Negative for abdominal distention, anal bleeding, blood in stool, constipation, diarrhea, nausea, rectal pain and vomiting. Endocrine: Negative for polydipsia, polyphagia and polyuria. Genitourinary: Negative for difficulty urinating and hematuria. Musculoskeletal: Neck pain: GERD. Skin: Negative for color change, pallor and rash. Neurological: Negative for dizziness, speech difficulty and headaches. Psychiatric/Behavioral: Negative for confusion and suicidal ideas. Objective:   /70 (Site: Left Upper Arm, Position: Sitting, Cuff Size: Small Adult)   Pulse 104   Temp 98 °F (36.7 °C)   Resp 12   Wt 153 lb (69.4 kg)   SpO2 98%   BMI 26.26 kg/m²     Physical Exam  Vitals reviewed. Constitutional:       General: She is not in acute distress. Appearance: Normal appearance. She is well-developed and well-groomed. HENT:      Head: Normocephalic and atraumatic. Nose: Nose normal.   Eyes:      General: No scleral icterus. Extraocular Movements: Extraocular movements intact. Conjunctiva/sclera: Conjunctivae normal.      Pupils: Pupils are equal, round, and reactive to light. Cardiovascular:      Rate and Rhythm: Normal rate and regular rhythm. Pulses: Normal pulses. Heart sounds: Normal heart sounds. Pulmonary:      Effort: Pulmonary effort is normal. No respiratory distress. Breath sounds: Normal breath sounds. No wheezing or rales.    Abdominal:      General: Future     Standing Expiration Date:   6/10/2022     Order Specific Question:   Reason for exam:     Answer:   RUQ pain     Order Specific Question:   Specify organ? Answer:   LIVER     Order Specific Question:   Specify organ? Answer:   GALLBLADDER     Orders Placed This Encounter   Medications    omeprazole (PRILOSEC) 40 MG delayed release capsule     Sig: Take 1 capsule by mouth every morning (before breakfast)     Dispense:  90 capsule     Refill:  1     Plan:  1. Heartburn / GERD  = Advised on the correct dose and timing of the PPI, 20 to 30 min before breakfast   = Pathophysiology and etiology of reflux discussed at length  = Lifestyle modification recommended and discussed with the patient   --Avoid spicy and acidic based foods   --Limit coffee, tea, alcohol use   --Limit the amount of food during meal time   --Avoid bedtime snacks and eat meals 3 to 4 hrs before lying down   --Elevate the head of the bed    --Keep healthy weight  EGD requested to assess for heartburn/GERD related complication. Assess the presence of hiatal hernia. The upper endoscopy procedure, complications, risk and benefit were reviewed. Patient would like to proceed accordingly. 2. Constipation  Recent colonoscopy was notable for tortuous colon, external hemorrhoids and diverticulosis, longstanding history of constipation, symptoms fit [place] criteria for IBS C  -MiraLAX 17 g once to twice daily titrate to response  -Colace 100 mg once to twice daily titrate to response    3. diverticulosis  Discuss the natural Hx of diverticular disease / Diverticulosis   Dietary fiber is associated with a decreased risk of symptomatic diverticular disease. A diet high in total fat and red meat is associated with an increased risk of symptomatic diverticular disease. There is no clear association between nut, corn, and popcorn consumption and the risk of diverticulosis and diverticular bleeding.   Physical activity appears to reduce the risk of diverticulitis and diverticular bleeding    3. Fm hx of liver disease  Reported family history of cirrhosis of unclear etiology, recent viral hepatitis studies negative, recent normal LFTs, normal platelets, no  clinical, lab, or radiological evidence of advanced liver disease  -proceed with ultrasound of the liver after EGD    4. Associated medical conditions history of bipolar disorder, hypertension, patient family history of liver problem not really clear      Return in about 4 weeks (around 7/8/2021), or if symptoms worsen or fail to improve, for EGD, post procedure results.       Renata Johnston, APRN - CNP

## 2021-08-04 ENCOUNTER — HOSPITAL ENCOUNTER (OUTPATIENT)
Dept: PHYSICAL THERAPY | Age: 51
Setting detail: THERAPIES SERIES
Discharge: HOME | End: 2021-08-04
Payer: MEDICARE

## 2021-08-04 PROCEDURE — 97110 THERAPEUTIC EXERCISES: CPT

## 2021-08-04 PROCEDURE — 97161 PT EVAL LOW COMPLEX 20 MIN: CPT

## 2021-08-04 ASSESSMENT — PAIN SCALES - GENERAL: PAINLEVEL_OUTOF10: 7

## 2021-08-04 ASSESSMENT — PAIN DESCRIPTION - DESCRIPTORS: DESCRIPTORS: SHOOTING;ACHING

## 2021-08-04 ASSESSMENT — PAIN DESCRIPTION - ORIENTATION: ORIENTATION: LOWER;RIGHT

## 2021-08-04 ASSESSMENT — PAIN DESCRIPTION - LOCATION: LOCATION: BACK;LEG

## 2021-08-04 ASSESSMENT — PAIN DESCRIPTION - FREQUENCY: FREQUENCY: CONTINUOUS
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
## 2021-08-10 ENCOUNTER — HOSPITAL ENCOUNTER (OUTPATIENT)
Dept: PHYSICAL THERAPY | Age: 51
Setting detail: THERAPIES SERIES
Discharge: HOME | End: 2021-08-10
Payer: MEDICARE

## 2021-08-13 ENCOUNTER — HOSPITAL ENCOUNTER (OUTPATIENT)
Dept: PHYSICAL THERAPY | Age: 51
Setting detail: THERAPIES SERIES
Discharge: HOME | End: 2021-08-13
Payer: MEDICARE

## 2021-08-17 ENCOUNTER — HOSPITAL ENCOUNTER (OUTPATIENT)
Dept: PHYSICAL THERAPY | Age: 51
Setting detail: THERAPIES SERIES
Discharge: HOME | End: 2021-08-17
Payer: MEDICARE

## 2021-08-17 PROCEDURE — 97110 THERAPEUTIC EXERCISES: CPT

## 2021-08-17 PROCEDURE — 97140 MANUAL THERAPY 1/> REGIONS: CPT

## 2021-08-17 ASSESSMENT — PAIN DESCRIPTION - ORIENTATION: ORIENTATION: RIGHT;LOWER

## 2021-08-17 ASSESSMENT — PAIN SCALES - GENERAL: PAINLEVEL_OUTOF10: 8

## 2021-08-17 ASSESSMENT — PAIN DESCRIPTION - FREQUENCY: FREQUENCY: CONTINUOUS

## 2021-08-17 ASSESSMENT — PAIN DESCRIPTION - LOCATION: LOCATION: BACK

## 2021-08-17 ASSESSMENT — PAIN DESCRIPTION - DESCRIPTORS: DESCRIPTORS: ACHING

## 2021-08-17 NOTE — PROGRESS NOTES
[id number] [address]  Outpatient Physical Therapy    Treatment Note        Date: 2021  Patient: Mattie Escobar  : 1970  ACCT #: [de-identified]  Referring Practitioner: Wong Robertson DO  Diagnosis: low back pain, unspedified osteoarthritis, unspecified site  Treatment Diagnosis: decreased hamstring flexibility, decreased proximal hip strength, decreased core strength, decreased standing posture, decreased activity tolerance and increased back pain with all housework, prolonged amb, bending, and stair negoitiation    Visit Information:  PT Visit Information  Onset Date:  (8 months ago)  PT Insurance Information: [place] [employer]  Total # of Visits Approved: 30  Total # of Visits to Date: 2  No Show: 0  Canceled Appointment: 2  Progress Note Counter:     Subjective: Pt arriving to appt >1 hr early today; able to accomodate pt to reduce waiting time. Pt reporting 8/10 pain level in Rt side of LB currently. Pts  [facility]) present for translation per pt preference. Comments: RTD 2 weeks  HEP Compliance:  [x] Good [] Fair [] Poor [] Reports not doing due to:    Vital Signs  Patient Currently in Pain: Yes   Pain Screening  Patient Currently in Pain: Yes  Pain Assessment  Pain Assessment: 0-10  Pain Level: 8  Pain Location: Back  Pain Orientation: Right; Lower  Pain Descriptors: Aching  Pain Frequency: Continuous    OBJECTIVE:   Exercises  Exercise 1: seated TA isometrics 5 sec X 10  Exercise 2: hamstring stretch 10 sec X 3 b/l  Exercise 3: LTR 10\"x10  Exercise 4: SKTC 3x30\"  Exercise 5: piriformis stretch 3x30\" (knee to opp shldr)  Exercise 6: SLR x10 b/l  Exercise 7: s/l hip abd x10  Exercise 8: DLS progression: 3 way x10 ea  Exercise 9: bridge 5\" hold, 2 x10  Exercise 10: prone hip ext x10 b/l  Exercise 20: HEP: piriformis stretch, bridges, 3 way SLR (Romanian version)     Strength: [x] NT  [] MMT completed:     ROM: [x] NT  [] ROM measurements:      Manual:   Manual therapy  Manual traction: lumbar attempted but increased pain. Soft Tissue Mobalization: STM, TPR & MFR bilateral lumbar paraspinals. Instructed  in tennis ball massage 8 minutes total.    Modalities:  Modalities  Moist heat: HP lumbar x 10 minutes to reduce tightness. *Indicates exercise, modality, or manual techniques to be initiated when appropriate    Assessment: Body structures, Functions, Activity limitations: Decreased functional mobility , Increased pain, Decreased posture, Decreased ROM, Decreased strength, Decreased endurance  Assessment: Initiated PT program per POC w/ focus on improving functional mobility and strength in support of LB. Good chalino to exs addressed though pt noting most pain w/ prone hip exts. In addition to Romansh translation, pt needing visual demonstration from therapist to achieve proper form/tech w/ most exs today. Trialed manual lumbar traction w/ gestures implying inc pain in Rt hip/back; further held upon pt confirming pain w/ very gentle pull. Pt provided w/ Romansh version of HEP; will cont progressing as appropriate. Treatment Diagnosis: decreased hamstring flexibility, decreased proximal hip strength, decreased core strength, decreased standing posture, decreased activity tolerance and increased back pain with all housework, prolonged amb, bending, and stair negoitiation  Prognosis: Good     Goals:  Short term goals  Time Frame for Short term goals: 2 wks  Short term goal 1: Pt will demonstrate improved lumbar spine AROM in all directions  >/= 50% of normal range for decreased pain with housework. Short term goal 2: Pt will demonstrate improved trunk extensor, abdominal, and B LE strength >/= 4/5 for carryover to decreased pain with sweeping the floor. Long term goals  Time Frame for Long term goals : 6 wks  Long term goal 1: Pt will demonstrate indep and compliance  with % of the time for self management of low back pain.   Long term goal 2: Pt will demonstrate improved score on modified oswestry back pain index to </= 25% for carryover to moderate disability and improved quality of life. Long term goal 3: The pt will report decreased pain </=2/10 at worst in order to increase ease with housework and prolonged amb. Progress toward goals:Progressing towards goals. POST-PAIN       Pain Rating (0-10 pain scale):   6/10   Location and pain description same as pre-treatment unless indicated. Action: [] NA   [x] Perform HEP  [] Meds as prescribed  [x] Modalities as prescribed   [] Call Physician     Frequency/Duration:  Plan  Times per week: 2  Plan weeks: 6  Current Treatment Recommendations: Strengthening, Neuromuscular Re-education, Home Exercise Program, ROM, Manual Therapy - Soft Tissue Mobilization, Safety Education & Training, Aquatics, Endurance Training, Patient/Caregiver Education & Training, Equipment Evaluation, Education, & procurement, Modalities, Pain Management, Positioning     Pt to continue current HEP. See objective section for any therapeutic exercise changes, additions or modifications this date.        PT Individual Minutes  Time In: 2097  Time Out: 1238  Minutes: 49  Timed Code Treatment Minutes: 38 Minutes  Procedure Minutes: HP 10     Timed Activity Minutes Units   Ther Ex 30 2   Manual  8 1     Signature:  Electronically signed by Ember Avila PTA on 8/17/21 at 4:24 PM EDT

## 2021-08-24 ENCOUNTER — HOSPITAL ENCOUNTER (OUTPATIENT)
Dept: PHYSICAL THERAPY | Age: 51
Setting detail: THERAPIES SERIES
Discharge: HOME | End: 2021-08-24
Payer: MEDICARE

## 2021-08-24 NOTE — PROGRESS NOTES
Therapy                            Cancellation/No-show Note      Date:  2021  Patient Name:  Roberts Schaumann  :  1970   MRN:  [id number]  Referring Practitioner: Sierra Galan DO  Diagnosis: low back pain, unspedified osteoarthritis, unspecified site    Visit Information:  PT Visit Information  Onset Date:  (8 months ago)  PT Insurance Information: [employer]  Total # of Visits Approved: 30  Total # of Visits to Date: 3  No Show: 1  Canceled Appointment: 3  Progress Note Counter:  (NS on 21) (cx 2021)    For today's appointment patient:  [x]  Cancelled  []  Rescheduled appointment  []  No-show   []  Called pt to remind of next appointment     Reason given by patient:  []  Patient ill  []  Conflicting appointment  []  No transportation    []  Conflict with work  []  No reason given  [x]  Other:  Pt requesting d/c    [] Pt has future appointments scheduled, no follow up needed  [] Pt requests to be on hold.     Reason:   If > 2 weeks please discuss with therapist.  [] Therapist to call pt for follow up      Signature: Electronically signed by Korin Robbins PT on 21 at 11:00 AM EDT

## 2021-08-24 NOTE — PROGRESS NOTES
Jesse hoffman [address]     Ph: [phone]  Fax: [phone]    [] Certification  [] Recertification []  Plan of Care  [] Progress Note [] Discharge      To:  Mara Ruelas DO      From:  [facility], PT  Patient: Jv Carroll     : 1970  Diagnosis: low back pain, unspedified osteoarthritis, unspecified site     Date: 2021  Treatment Diagnosis: decreased hamstring flexibility, decreased proximal hip strength, decreased core strength, decreased standing posture, decreased activity tolerance and increased back pain with all housework, prolonged amb, bending, and stair negoitiation  Progress Report Period from:  2021  to 2021    Total # of Visits to Date: 3   No Show: 1    Canceled Appointment: 3     OBJECTIVE:   Short Term Goals - Time Frame for Short term goals: 2 wks    Goals Current/Discharge status  Met   Short term goal 1: Pt will demonstrate improved lumbar spine AROM in all directions  >/= 50% of normal range for decreased pain with housework. Unable to assess due pt requesting d/c over the phone [] yes  [x] no   Short term goal 2: Pt will demonstrate improved trunk extensor, abdominal, and B LE strength >/= 4/5 for carryover to decreased pain with sweeping the floor. Unable to assess due pt requesting d/c over the phone [] yes  [x] no     Long Term Goals - Time Frame for Long term goals : 6 wks  Goals Current/ Discharge status Met   Long term goal 1: Pt will demonstrate indep and compliance  with % of the time for self management of low back pain. Unable to assess due pt requesting d/c over the phone [] yes  [x] no   Long term goal 2: Pt will demonstrate improved score on modified oswestry back pain index to </= 25% for carryover to moderate disability and improved quality of life.  Unable to assess due pt requesting d/c over the phone [] yes  [x] no   Long term goal 3: The pt will

## 2021-08-27 ENCOUNTER — APPOINTMENT (OUTPATIENT)
Dept: PHYSICAL THERAPY | Age: 51
End: 2021-08-27
Payer: MEDICARE

## 2021-11-03 ENCOUNTER — HOSPITAL ENCOUNTER (OUTPATIENT)
Dept: WOMENS IMAGING | Age: 51
Discharge: HOME | End: 2021-11-05
Payer: MEDICARE

## 2021-11-03 DIAGNOSIS — Z12.31 ENCOUNTER FOR SCREENING MAMMOGRAM FOR MALIGNANT NEOPLASM OF BREAST: ICD-10-CM

## 2021-11-03 PROCEDURE — 77067 SCR MAMMO BI INCL CAD: CPT

## 2021-12-15 ENCOUNTER — HOSPITAL ENCOUNTER (OUTPATIENT)
Dept: WOMENS IMAGING | Age: 51
Discharge: HOME | End: 2021-12-17
Payer: MEDICARE

## 2021-12-15 DIAGNOSIS — R92.8 ABNORMAL MAMMOGRAM: ICD-10-CM

## 2021-12-15 PROCEDURE — G0279 TOMOSYNTHESIS, MAMMO: HCPCS

## 2022-01-16 ENCOUNTER — HOSPITAL ENCOUNTER (OUTPATIENT)
Dept: GENERAL RADIOLOGY | Age: 52
Discharge: HOME | End: 2022-01-18
Payer: MEDICARE

## 2022-01-16 DIAGNOSIS — M54.9 DORSALGIA: ICD-10-CM

## 2022-01-16 PROCEDURE — 72110 X-RAY EXAM L-2 SPINE 4/>VWS: CPT

## 2022-10-25 ENCOUNTER — APPOINTMENT (OUTPATIENT)
Dept: CT IMAGING | Age: 52
End: 2022-10-25
Payer: MEDICARE

## 2022-10-25 ENCOUNTER — APPOINTMENT (OUTPATIENT)
Dept: GENERAL RADIOLOGY | Age: 52
End: 2022-10-25
Payer: MEDICARE

## 2022-10-25 ENCOUNTER — HOSPITAL ENCOUNTER (EMERGENCY)
Age: 52
Discharge: HOME | End: 2022-10-26
Payer: MEDICARE

## 2022-10-25 DIAGNOSIS — I95.9 HYPOTENSION, UNSPECIFIED HYPOTENSION TYPE: ICD-10-CM

## 2022-10-25 DIAGNOSIS — R55 SYNCOPE, UNSPECIFIED SYNCOPE TYPE: ICD-10-CM

## 2022-10-25 DIAGNOSIS — R07.9 CHEST PAIN, UNSPECIFIED TYPE: Primary | ICD-10-CM

## 2022-10-25 LAB
ALBUMIN SERPL-MCNC: 3.9 G/DL (ref 3.5–4.6)
ALP BLD-CCNC: 103 U/L (ref 40–130)
ALT SERPL-CCNC: 13 U/L (ref 0–33)
ANION GAP SERPL CALCULATED.3IONS-SCNC: 14 MEQ/L (ref 9–15)
AST SERPL-CCNC: 15 U/L (ref 0–35)
BASOPHILS ABSOLUTE: 0 K/UL (ref 0–0.2)
BASOPHILS RELATIVE PERCENT: 0.4 %
BILIRUB SERPL-MCNC: <0.2 MG/DL (ref 0.2–0.7)
BUN BLDV-MCNC: 13 MG/DL (ref 6–20)
CALCIUM SERPL-MCNC: 9 MG/DL (ref 8.5–9.9)
CHLORIDE BLD-SCNC: 105 MEQ/L (ref 95–107)
CO2: 22 MEQ/L (ref 20–31)
CREAT SERPL-MCNC: 0.92 MG/DL (ref 0.5–0.9)
EOSINOPHILS ABSOLUTE: 0.1 K/UL (ref 0–0.7)
EOSINOPHILS RELATIVE PERCENT: 0.9 %
GFR SERPL CREATININE-BSD FRML MDRD: >60 ML/MIN/{1.73_M2}
GLOBULIN: 2.7 G/DL (ref 2.3–3.5)
GLUCOSE BLD-MCNC: 103 MG/DL (ref 70–99)
HCT VFR BLD CALC: 37.6 % (ref 37–47)
HEMOGLOBIN: 12.8 G/DL (ref 12–16)
LACTIC ACID: 3.8 MMOL/L (ref 0.5–2.2)
LYMPHOCYTES ABSOLUTE: 1 K/UL (ref 1–4.8)
LYMPHOCYTES RELATIVE PERCENT: 12.3 %
MAGNESIUM: 2 MG/DL (ref 1.7–2.4)
MCH RBC QN AUTO: 31.1 PG (ref 27–31.3)
MCHC RBC AUTO-ENTMCNC: 34.1 % (ref 33–37)
MCV RBC AUTO: 91.2 FL (ref 79.4–94.8)
MONOCYTES ABSOLUTE: 1.1 K/UL (ref 0.2–0.8)
MONOCYTES RELATIVE PERCENT: 14 %
NEUTROPHILS ABSOLUTE: 5.7 K/UL (ref 1.4–6.5)
NEUTROPHILS RELATIVE PERCENT: 72.4 %
PDW BLD-RTO: 12.6 % (ref 11.5–14.5)
PLATELET # BLD: 177 K/UL (ref 130–400)
POTASSIUM SERPL-SCNC: 3.9 MEQ/L (ref 3.4–4.9)
PROCALCITONIN: 0.03 NG/ML (ref 0–0.15)
RBC # BLD: 4.12 M/UL (ref 4.2–5.4)
SODIUM BLD-SCNC: 141 MEQ/L (ref 135–144)
TOTAL PROTEIN: 6.6 G/DL (ref 6.3–8)
TROPONIN: <0.01 NG/ML (ref 0–0.01)
WBC # BLD: 7.8 K/UL (ref 4.8–10.8)

## 2022-10-25 PROCEDURE — 36415 COLL VENOUS BLD VENIPUNCTURE: CPT

## 2022-10-25 PROCEDURE — 2580000003 HC RX 258: Performed by: PHYSICIAN ASSISTANT

## 2022-10-25 PROCEDURE — 84484 ASSAY OF TROPONIN QUANT: CPT

## 2022-10-25 PROCEDURE — 84145 PROCALCITONIN (PCT): CPT

## 2022-10-25 PROCEDURE — 71046 X-RAY EXAM CHEST 2 VIEWS: CPT

## 2022-10-25 PROCEDURE — 93005 ELECTROCARDIOGRAM TRACING: CPT | Performed by: PHYSICIAN ASSISTANT

## 2022-10-25 PROCEDURE — 83605 ASSAY OF LACTIC ACID: CPT

## 2022-10-25 PROCEDURE — 83735 ASSAY OF MAGNESIUM: CPT

## 2022-10-25 PROCEDURE — 80053 COMPREHEN METABOLIC PANEL: CPT

## 2022-10-25 PROCEDURE — 81003 URINALYSIS AUTO W/O SCOPE: CPT

## 2022-10-25 PROCEDURE — 85025 COMPLETE CBC W/AUTO DIFF WBC: CPT

## 2022-10-25 PROCEDURE — 72100 X-RAY EXAM L-S SPINE 2/3 VWS: CPT

## 2022-10-25 PROCEDURE — 70450 CT HEAD/BRAIN W/O DYE: CPT

## 2022-10-25 RX ORDER — KETOROLAC TROMETHAMINE 30 MG/ML
30 INJECTION, SOLUTION INTRAMUSCULAR; INTRAVENOUS ONCE
Status: COMPLETED | OUTPATIENT
Start: 2022-10-25 | End: 2022-10-26

## 2022-10-25 RX ORDER — 0.9 % SODIUM CHLORIDE 0.9 %
1000 INTRAVENOUS SOLUTION INTRAVENOUS ONCE
Status: COMPLETED | OUTPATIENT
Start: 2022-10-25 | End: 2022-10-25

## 2022-10-25 RX ADMIN — SODIUM CHLORIDE 1000 ML: 9 INJECTION, SOLUTION INTRAVENOUS at 21:35

## 2022-10-25 ASSESSMENT — PAIN SCALES - GENERAL: PAINLEVEL_OUTOF10: 8

## 2022-10-25 ASSESSMENT — ENCOUNTER SYMPTOMS
EYE DISCHARGE: 0
NAUSEA: 0
BACK PAIN: 1
VOMITING: 0
ANAL BLEEDING: 0
COUGH: 0
ABDOMINAL DISTENTION: 0
BLOOD IN STOOL: 0
APNEA: 0

## 2022-10-25 ASSESSMENT — PAIN DESCRIPTION - PAIN TYPE: TYPE: ACUTE PAIN

## 2022-10-25 ASSESSMENT — PAIN DESCRIPTION - ORIENTATION: ORIENTATION: LOWER

## 2022-10-25 ASSESSMENT — PAIN - FUNCTIONAL ASSESSMENT: PAIN_FUNCTIONAL_ASSESSMENT: 0-10

## 2022-10-25 ASSESSMENT — PAIN DESCRIPTION - LOCATION: LOCATION: BACK

## 2022-10-25 ASSESSMENT — PAIN DESCRIPTION - DESCRIPTORS: DESCRIPTORS: ACHING;DULL

## 2022-10-26 VITALS
TEMPERATURE: 97.9 F | DIASTOLIC BLOOD PRESSURE: 80 MMHG | WEIGHT: 144 LBS | HEIGHT: 64 IN | OXYGEN SATURATION: 95 % | RESPIRATION RATE: 13 BRPM | SYSTOLIC BLOOD PRESSURE: 107 MMHG | BODY MASS INDEX: 24.59 KG/M2 | HEART RATE: 81 BPM

## 2022-10-26 LAB
BILIRUBIN URINE: NEGATIVE
BLOOD, URINE: NEGATIVE
CLARITY: CLEAR
COLOR: YELLOW
EKG ATRIAL RATE: 97 BPM
EKG P AXIS: 37 DEGREES
EKG P-R INTERVAL: 128 MS
EKG Q-T INTERVAL: 370 MS
EKG QRS DURATION: 88 MS
EKG QTC CALCULATION (BAZETT): 469 MS
EKG R AXIS: 8 DEGREES
EKG T AXIS: 33 DEGREES
EKG VENTRICULAR RATE: 97 BPM
GLUCOSE URINE: NEGATIVE MG/DL
KETONES, URINE: ABNORMAL MG/DL
LACTIC ACID: 1.6 MMOL/L (ref 0.5–2.2)
LEUKOCYTE ESTERASE, URINE: NEGATIVE
NITRITE, URINE: NEGATIVE
PH UA: 6 (ref 5–9)
PROTEIN UA: NEGATIVE MG/DL
SPECIFIC GRAVITY UA: 1.03 (ref 1–1.03)
URINE REFLEX TO CULTURE: ABNORMAL
UROBILINOGEN, URINE: 0.2 E.U./DL

## 2022-10-26 PROCEDURE — 36415 COLL VENOUS BLD VENIPUNCTURE: CPT

## 2022-10-26 PROCEDURE — 83605 ASSAY OF LACTIC ACID: CPT

## 2022-10-26 PROCEDURE — 96374 THER/PROPH/DIAG INJ IV PUSH: CPT

## 2022-10-26 PROCEDURE — 6360000002 HC RX W HCPCS: Performed by: PHYSICIAN ASSISTANT

## 2022-10-26 PROCEDURE — 99285 EMERGENCY DEPT VISIT HI MDM: CPT

## 2022-10-26 RX ORDER — MELOXICAM 7.5 MG/1
7.5 TABLET ORAL DAILY
Qty: 10 TABLET | Refills: 0 | Status: SHIPPED | OUTPATIENT
Start: 2022-10-26

## 2022-10-26 RX ADMIN — KETOROLAC TROMETHAMINE 30 MG: 30 INJECTION, SOLUTION INTRAMUSCULAR at 00:15

## 2022-10-26 ASSESSMENT — PAIN SCALES - GENERAL: PAINLEVEL_OUTOF10: 8

## 2022-10-26 NOTE — ED PROVIDER NOTES
[address] [facility] ED  eMERGENCY dEPARTMENT eNCOUnter      Pt Name: Brooklyn Wilson  MRN: [id number]  [place] 1970  Date of evaluation: 10/25/2022  Provider: Nelson Bryan PA-C    CHIEF COMPLAINT       Chief Complaint   Patient presents with    Extremity Weakness     Fallen twice in 2 days. +head injury yesterday -LOC -thinners         HISTORY OF PRESENT ILLNESS   (Location/Symptom, Timing/Onset,Context/Setting, Quality, Duration, Modifying Factors, Severity)  Note limiting factors. Brooklyn Wilson is a 47 y.o. female who presents to the emergency department using AVM  [id number] discussed with patient she comes in for chest pain back pain abdominal pain. With  translating denies falling states when she feels faint she goes and lies down on her bed. She has not fallen. Denies headache neck pain difficulty seeing hearing speaking pain burning frequent urination rectal bleeding urinary bleeding. Patient states she has been seen for similar issues in the past and has an appointment with cardiology next month. HPI    NursingNotes were reviewed. REVIEW OF SYSTEMS    (2-9 systems for level 4, 10 or more for level 5)     Review of Systems   Constitutional:  Negative for activity change, appetite change and unexpected weight change. HENT:  Negative for ear discharge, ear pain and nosebleeds. Eyes:  Negative for discharge and visual disturbance. Respiratory:  Negative for apnea and cough. Cardiovascular:  Positive for chest pain. Gastrointestinal:  Negative for abdominal distention, anal bleeding, blood in stool, nausea and vomiting. Genitourinary:  Negative for dysuria and hematuria. Musculoskeletal:  Positive for back pain. Negative for joint swelling, neck pain and neck stiffness. Skin:  Negative for pallor. Neurological:  Positive for syncope and weakness. Negative for dizziness, seizures and facial asymmetry. Psychiatric/Behavioral:  Negative for self-injury. All other systems reviewed and are negative. Except as noted above the remainder of the review of systems was reviewed and negative. PAST MEDICAL HISTORY     Past Medical History:   Diagnosis Date    Cough     has a history of cough and was given inhaler    Hypertension          SURGICALHISTORY       Past Surgical History:   Procedure Laterality Date    CARPAL TUNNEL RELEASE Bilateral     COLONOSCOPY N/A 6/8/2021    COLONOSCOPY DIAGNOSTIC performed by Jordan Randle MD at [address]       Previous Medications    BUPROPION (WELLBUTRIN XL) 150 MG EXTENDED RELEASE TABLET        CYCLOBENZAPRINE (FLEXERIL) 10 MG TABLET        DIVALPROEX (DEPAKOTE) 500 MG DR TABLET        GABAPENTIN (NEURONTIN) 800 MG TABLET        LISINOPRIL (PRINIVIL;ZESTRIL) 5 MG TABLET        OMEPRAZOLE (PRILOSEC) 40 MG DELAYED RELEASE CAPSULE    Take 1 capsule by mouth every morning (before breakfast)    RISPERIDONE (RISPERDAL) 0.5 MG TABLET    Take 0.5 mg by mouth 2 times daily    TRAZODONE (DESYREL) 50 MG TABLET    Take 50 mg by mouth nightly            Patient has no known allergies.     FAMILY HISTORY       Family History   Problem Relation Age of Onset    Colon Cancer Brother     Breast Cancer Sister           SOCIAL HISTORY       Social History     Socioeconomic History    Marital status: Single   Tobacco Use    Smoking status: Never    Smokeless tobacco: Never   Substance and Sexual Activity    Alcohol use: Not Currently    Drug use: Yes     Comment: last use sunday 5/23/21       SCREENINGS   Ebola Virus Disease (EVD) Screening   Temp: 97.9 °F (36.6 °C)  CIWA Assessment  BP: 107/80  Heart Rate: 81    PHYSICAL EXAM    (up to 7 for level 4, 8 or more for level 5)     ED Triage Vitals [10/25/22 2050]   BP Temp Temp Source Heart Rate Resp SpO2 Height Weight   (!) 93/58 97.9 °F (36.6 °C) Temporal (!) 112 16 96 % 5' 4\" (1.626 m) 144 lb (65.3 kg) Physical Exam  Vitals and nursing note reviewed. Constitutional:       General: She is not in acute distress. Appearance: She is well-developed. HENT:      Head: Normocephalic and atraumatic. Right Ear: Tympanic membrane and external ear normal.      Left Ear: Tympanic membrane and external ear normal.      Nose: Nose normal.      Mouth/Throat:      Mouth: Mucous membranes are moist.   Eyes:      General:         Right eye: No discharge. Left eye: No discharge. Extraocular Movements: Extraocular movements intact. Pupils: Pupils are equal, round, and reactive to light. Cardiovascular:      Rate and Rhythm: Normal rate and regular rhythm. Pulses: Normal pulses. Heart sounds: Normal heart sounds. Pulmonary:      Effort: Pulmonary effort is normal. No respiratory distress. Breath sounds: Normal breath sounds. No stridor. No wheezing, rhonchi or rales. Comments: Right anterior chest wall tenderness  Chest:      Chest wall: Tenderness present. Abdominal:      General: Bowel sounds are normal. There is no distension. Palpations: Abdomen is soft. Tenderness: There is no abdominal tenderness. There is no right CVA tenderness or left CVA tenderness. Musculoskeletal:         General: Tenderness present. Normal range of motion. Cervical back: Normal range of motion and neck supple. No rigidity or tenderness. Back:       Right lower leg: No edema. Left lower leg: No edema. Skin:     General: Skin is warm. Findings: No erythema. Neurological:      Mental Status: She is alert and oriented to person, place, and time. Motor: No weakness.       Gait: Gait normal.   Psychiatric:         Mood and Affect: Mood normal.       RESULTS     EKG: All EKG's are interpreted by the Emergency Department Physician who either signs or Co-signsthis chart in the absence of a cardiologist.  EKG normal sinus rhythm rate 97 negative ST segment elevation. NH interval 120 ms QRS 88 ms  ms PRT axes 37, 8, 33       RADIOLOGY:   Non-plain filmimages such as CT, Ultrasound and MRI are read by the radiologist. Plain radiographic images are visualized and preliminarily interpreted by the emergency physician with the below findings:         Interpretation per the Radiologist below, if available at the time ofthis note:    XR CHEST (2 VW)   Final Result   No acute process. Possible trace bilateral pleural effusions with associated   atelectasis. XR LUMBAR SPINE (2-3 VIEWS)   Final Result   Degenerative changes redemonstrated, most conspicuous at L5-S1, as further   discussed above. CT Head WO Contrast    (Results Pending)         ED BEDSIDE ULTRASOUND:   Performed by ED Physician - none    LABS:  Labs Reviewed   COMPREHENSIVE METABOLIC PANEL - Abnormal; Notable for the following components:       Result Value    Glucose 103 (*)     Creatinine 0.92 (*)     All other components within normal limits   CBC WITH AUTO DIFFERENTIAL - Abnormal; Notable for the following components:    RBC 4.12 (*)     Monocytes Absolute 1.1 (*)     All other components within normal limits   LACTIC ACID - Abnormal; Notable for the following components:    Lactic Acid 3.8 (*)     All other components within normal limits    Narrative:     Cat Valentine tel. [phone],  Chemistry results called to and read back by , 10/25/2022 21:57, by [id number] - Abnormal; Notable for the following components:    Ketones, Urine TRACE (*)     All other components within normal limits   MAGNESIUM   PROCALCITONIN   TROPONIN   LACTIC ACID       All other labs were within normal range or not returned as of this dictation.     EMERGENCY DEPARTMENT COURSE and DIFFERENTIAL DIAGNOSIS/MDM:   Vitals:    Vitals:    10/25/22 2050 10/26/22 0000   BP: (!) 93/58 107/80   Pulse: (!) 112 81   Resp: 16 13   Temp: 97.9 °F (36.6 °C)    TempSrc: Temporal    SpO2: 96% 95%   Weight: 144 lb (65.3 kg)    Height: 5' 4\" (1.626 m)             MDM  Number of Diagnoses or Management Options  Chest pain, unspecified type  Hypotension, unspecified hypotension type  Syncope, unspecified syncope type  Diagnosis management comments: Patient noted to be hypotensive on arrival.  Will add fluids. Pt Denies falling with  she states that when she feels faint she lies down on her bed has not fallen. Patient has no numbness and tingling denies any headache denies any head injury denies difficulty seeing hearing speaking. Patient has no loss of bowel or bladder control. Patient is ambulatory in emergency room. She was initially hypotensive on arrival with fluids she has improved her chest pain was reproducible right-sided chest wall tenderness anteriorly       Amount and/or Complexity of Data Reviewed  Clinical lab tests: reviewed and ordered  Tests in the radiology section of CPT®: ordered        CRITICAL CARE TIME   Total Critical Care time was   minutes, excluding separately reportableprocedures. There was a high probability of clinicallysignificant/life threatening deterioration in the patient's condition which required my urgent intervention. CONSULTS:  None    PROCEDURES:  Unless otherwise noted below, none     Procedures    FINAL IMPRESSION      1. Chest pain, unspecified type    2. Hypotension, unspecified hypotension type    3.  Syncope, unspecified syncope type          DISPOSITION/PLAN   DISPOSITION Decision To Discharge 10/26/2022 01:42:53 AM      PATIENT REFERRED TO:  Radha Barney, [address] [facility] [address]  [phone]    Call in 1 day      [facility]) ED  [address] [facility]  [phone]  Go to   If symptoms worsen    Mateo Sandifer, MD  [address] [place] [address] [phone]    Call in 1 day      DISCHARGE MEDICATIONS:  New Prescriptions    MELOXICAM (MOBIC) 7.5 MG TABLET    Take 1 tablet by mouth daily (Please note that portions of this note were completed with a voice recognition program.  Efforts were made to edit the dictations but occasionally words are mis-transcribed.)    Maggy Kaur PA-C (electronically signed)  Attending Emergency Physician        Maggy Kaur PA-C  10/26/22 [id number] Cesar Weems PA-C  10/26/22 0151

## 2023-03-04 ENCOUNTER — APPOINTMENT (OUTPATIENT)
Dept: CT IMAGING | Age: 53
End: 2023-03-04
Payer: COMMERCIAL

## 2023-03-04 ENCOUNTER — HOSPITAL ENCOUNTER (EMERGENCY)
Age: 53
Discharge: HOME | End: 2023-03-04
Payer: COMMERCIAL

## 2023-03-04 ENCOUNTER — APPOINTMENT (OUTPATIENT)
Dept: GENERAL RADIOLOGY | Age: 53
End: 2023-03-04
Payer: COMMERCIAL

## 2023-03-04 VITALS
RESPIRATION RATE: 14 BRPM | OXYGEN SATURATION: 94 % | TEMPERATURE: 98.7 F | HEART RATE: 75 BPM | SYSTOLIC BLOOD PRESSURE: 128 MMHG | BODY MASS INDEX: 24.59 KG/M2 | DIASTOLIC BLOOD PRESSURE: 80 MMHG | HEIGHT: 64 IN | WEIGHT: 144 LBS

## 2023-03-04 DIAGNOSIS — R55 SYNCOPE AND COLLAPSE: Primary | ICD-10-CM

## 2023-03-04 DIAGNOSIS — J90 PLEURAL EFFUSION: ICD-10-CM

## 2023-03-04 DIAGNOSIS — F14.90 COCAINE USE: ICD-10-CM

## 2023-03-04 LAB
ALBUMIN SERPL-MCNC: 3.9 G/DL (ref 3.5–4.6)
ALP BLD-CCNC: 72 U/L (ref 40–130)
ALT SERPL-CCNC: 10 U/L (ref 0–33)
AMPHETAMINE SCREEN, URINE: ABNORMAL
ANION GAP SERPL CALCULATED.3IONS-SCNC: 12 MEQ/L (ref 9–15)
APTT: 24.4 SEC (ref 24.4–36.8)
AST SERPL-CCNC: 17 U/L (ref 0–35)
BACTERIA: NEGATIVE /HPF
BARBITURATE SCREEN URINE: ABNORMAL
BASE EXCESS ARTERIAL: -1 (ref -3–3)
BASOPHILS ABSOLUTE: 0.1 K/UL (ref 0–0.2)
BASOPHILS RELATIVE PERCENT: 0.7 %
BENZODIAZEPINE SCREEN, URINE: ABNORMAL
BILIRUB SERPL-MCNC: 0.4 MG/DL (ref 0.2–0.7)
BILIRUBIN URINE: NEGATIVE
BLOOD, URINE: NEGATIVE
BUN BLDV-MCNC: 15 MG/DL (ref 6–20)
CALCIUM IONIZED: 1.24 MMOL/L (ref 1.12–1.32)
CALCIUM SERPL-MCNC: 9 MG/DL (ref 8.5–9.9)
CANNABINOID SCREEN URINE: ABNORMAL
CHLORIDE BLD-SCNC: 101 MEQ/L (ref 95–107)
CHP ED QC CHECK: NORMAL
CLARITY: CLEAR
CO2: 23 MEQ/L (ref 20–31)
COCAINE METABOLITE SCREEN URINE: POSITIVE
COLOR: YELLOW
CREAT SERPL-MCNC: 1.22 MG/DL (ref 0.5–0.9)
EKG ATRIAL RATE: 72 BPM
EKG P AXIS: 45 DEGREES
EKG P-R INTERVAL: 126 MS
EKG Q-T INTERVAL: 420 MS
EKG QRS DURATION: 88 MS
EKG QTC CALCULATION (BAZETT): 459 MS
EKG R AXIS: 38 DEGREES
EKG T AXIS: 65 DEGREES
EKG VENTRICULAR RATE: 72 BPM
EOSINOPHILS ABSOLUTE: 0.1 K/UL (ref 0–0.7)
EOSINOPHILS RELATIVE PERCENT: 1.4 %
EPITHELIAL CELLS, UA: NORMAL /HPF (ref 0–5)
ETHANOL PERCENT: NORMAL G/DL
ETHANOL: <10 MG/DL (ref 0–0.08)
FENTANYL SCREEN, URINE: ABNORMAL
GFR SERPL CREATININE-BSD FRML MDRD: 45 ML/MIN/{1.73_M2}
GFR SERPL CREATININE-BSD FRML MDRD: 52.7 ML/MIN/{1.73_M2}
GFR SERPL CREATININE-BSD FRML MDRD: 60 ML/MIN/{1.73_M2}
GLOBULIN: 2.9 G/DL (ref 2.3–3.5)
GLUCOSE BLD-MCNC: 138 MG/DL
GLUCOSE BLD-MCNC: 138 MG/DL (ref 70–99)
GLUCOSE BLD-MCNC: 143 MG/DL (ref 70–99)
GLUCOSE BLD-MCNC: 154 MG/DL (ref 70–99)
GLUCOSE URINE: NEGATIVE MG/DL
HCO3 ARTERIAL: 24.3 MMOL/L (ref 21–29)
HCT VFR BLD CALC: 38.2 % (ref 37–47)
HEMOGLOBIN: 12.5 GM/DL (ref 12–16)
HEMOGLOBIN: 12.7 G/DL (ref 12–16)
HYALINE CASTS: NORMAL /HPF (ref 0–5)
INR BLD: 1.1
KETONES, URINE: ABNORMAL MG/DL
LACTATE: 2.04 MMOL/L (ref 0.4–2)
LEUKOCYTE ESTERASE, URINE: NEGATIVE
LYMPHOCYTES ABSOLUTE: 1.8 K/UL (ref 1–4.8)
LYMPHOCYTES RELATIVE PERCENT: 20.8 %
Lab: ABNORMAL
MAGNESIUM: 2.1 MG/DL (ref 1.7–2.4)
MCH RBC QN AUTO: 31 PG (ref 27–31.3)
MCHC RBC AUTO-ENTMCNC: 33.4 % (ref 33–37)
MCV RBC AUTO: 93 FL (ref 79.4–94.8)
METHADONE SCREEN, URINE: ABNORMAL
MONOCYTES ABSOLUTE: 0.5 K/UL (ref 0.2–0.8)
MONOCYTES RELATIVE PERCENT: 6.3 %
NEUTROPHILS ABSOLUTE: 6 K/UL (ref 1.4–6.5)
NEUTROPHILS RELATIVE PERCENT: 70.8 %
NITRITE, URINE: NEGATIVE
O2 SAT, ARTERIAL: 93 % (ref 93–100)
OPIATE SCREEN URINE: ABNORMAL
OXYCODONE URINE: ABNORMAL
PCO2 ARTERIAL: 41 MM HG (ref 35–45)
PDW BLD-RTO: 13.1 % (ref 11.5–14.5)
PERFORMED ON: ABNORMAL
PH ARTERIAL: 7.39 (ref 7.35–7.45)
PH UA: 6.5 (ref 5–9)
PHENCYCLIDINE SCREEN URINE: ABNORMAL
PLATELET # BLD: 243 K/UL (ref 130–400)
PO2 ARTERIAL: 68 MM HG (ref 75–108)
POC CHLORIDE: 107 MEQ/L (ref 99–110)
POC CREATININE: 1.1 MG/DL (ref 0.6–1.2)
POC CREATININE: 1.4 MG/DL (ref 0.6–1.2)
POC FIO2: 21
POC HEMATOCRIT: 37 % (ref 36–48)
POC POTASSIUM: 3.7 MEQ/L (ref 3.5–5.1)
POC SAMPLE TYPE: ABNORMAL
POC SAMPLE TYPE: ABNORMAL
POC SODIUM: 141 MEQ/L (ref 136–145)
POTASSIUM SERPL-SCNC: 3.7 MEQ/L (ref 3.4–4.9)
PROPOXYPHENE SCREEN: ABNORMAL
PROTEIN UA: 100 MG/DL
PROTHROMBIN TIME: 13.8 SEC (ref 12.3–14.9)
RBC # BLD: 4.11 M/UL (ref 4.2–5.4)
RBC UA: NORMAL /HPF (ref 0–2)
SODIUM BLD-SCNC: 136 MEQ/L (ref 135–144)
SPECIFIC GRAVITY UA: 1.02 (ref 1–1.03)
TCO2 ARTERIAL: 26 MMOL/L (ref 21–32)
TOTAL CK: 135 U/L (ref 0–170)
TOTAL PROTEIN: 6.8 G/DL (ref 6.3–8)
TROPONIN: <0.01 NG/ML (ref 0–0.01)
UROBILINOGEN, URINE: 1 E.U./DL
WBC # BLD: 8.5 K/UL (ref 4.8–10.8)
WBC UA: NORMAL /HPF (ref 0–5)

## 2023-03-04 PROCEDURE — 70496 CT ANGIOGRAPHY HEAD: CPT

## 2023-03-04 PROCEDURE — 82330 ASSAY OF CALCIUM: CPT

## 2023-03-04 PROCEDURE — 85610 PROTHROMBIN TIME: CPT

## 2023-03-04 PROCEDURE — 72128 CT CHEST SPINE W/O DYE: CPT

## 2023-03-04 PROCEDURE — 71045 X-RAY EXAM CHEST 1 VIEW: CPT

## 2023-03-04 PROCEDURE — 70450 CT HEAD/BRAIN W/O DYE: CPT

## 2023-03-04 PROCEDURE — 84295 ASSAY OF SERUM SODIUM: CPT

## 2023-03-04 PROCEDURE — 36415 COLL VENOUS BLD VENIPUNCTURE: CPT

## 2023-03-04 PROCEDURE — 85730 THROMBOPLASTIN TIME PARTIAL: CPT

## 2023-03-04 PROCEDURE — 82948 REAGENT STRIP/BLOOD GLUCOSE: CPT

## 2023-03-04 PROCEDURE — 85014 HEMATOCRIT: CPT

## 2023-03-04 PROCEDURE — 84484 ASSAY OF TROPONIN QUANT: CPT

## 2023-03-04 PROCEDURE — 84132 ASSAY OF SERUM POTASSIUM: CPT

## 2023-03-04 PROCEDURE — 70498 CT ANGIOGRAPHY NECK: CPT

## 2023-03-04 PROCEDURE — 82803 BLOOD GASES ANY COMBINATION: CPT

## 2023-03-04 PROCEDURE — 82565 ASSAY OF CREATININE: CPT

## 2023-03-04 PROCEDURE — 99285 EMERGENCY DEPT VISIT HI MDM: CPT

## 2023-03-04 PROCEDURE — 71275 CT ANGIOGRAPHY CHEST: CPT

## 2023-03-04 PROCEDURE — 83605 ASSAY OF LACTIC ACID: CPT

## 2023-03-04 PROCEDURE — 82077 ASSAY SPEC XCP UR&BREATH IA: CPT

## 2023-03-04 PROCEDURE — 85025 COMPLETE CBC W/AUTO DIFF WBC: CPT

## 2023-03-04 PROCEDURE — 36600 WITHDRAWAL OF ARTERIAL BLOOD: CPT

## 2023-03-04 PROCEDURE — 82550 ASSAY OF CK (CPK): CPT

## 2023-03-04 PROCEDURE — 2580000003 HC RX 258: Performed by: PHYSICIAN ASSISTANT

## 2023-03-04 PROCEDURE — 6360000004 HC RX CONTRAST MEDICATION: Performed by: PHYSICIAN ASSISTANT

## 2023-03-04 PROCEDURE — 72131 CT LUMBAR SPINE W/O DYE: CPT

## 2023-03-04 PROCEDURE — 82435 ASSAY OF BLOOD CHLORIDE: CPT

## 2023-03-04 PROCEDURE — 72125 CT NECK SPINE W/O DYE: CPT

## 2023-03-04 PROCEDURE — 80053 COMPREHEN METABOLIC PANEL: CPT

## 2023-03-04 PROCEDURE — 83735 ASSAY OF MAGNESIUM: CPT

## 2023-03-04 PROCEDURE — 80307 DRUG TEST PRSMV CHEM ANLYZR: CPT

## 2023-03-04 PROCEDURE — 74177 CT ABD & PELVIS W/CONTRAST: CPT

## 2023-03-04 PROCEDURE — 81001 URINALYSIS AUTO W/SCOPE: CPT

## 2023-03-04 RX ORDER — 0.9 % SODIUM CHLORIDE 0.9 %
1000 INTRAVENOUS SOLUTION INTRAVENOUS ONCE
Status: COMPLETED | OUTPATIENT
Start: 2023-03-04 | End: 2023-03-04

## 2023-03-04 RX ORDER — 0.9 % SODIUM CHLORIDE 0.9 %
1000 INTRAVENOUS SOLUTION INTRAVENOUS ONCE
Status: DISCONTINUED | OUTPATIENT
Start: 2023-03-04 | End: 2023-03-04

## 2023-03-04 RX ADMIN — SODIUM CHLORIDE 1000 ML: 9 INJECTION, SOLUTION INTRAVENOUS at 16:35

## 2023-03-04 RX ADMIN — IOPAMIDOL 75 ML: 612 INJECTION, SOLUTION INTRAVENOUS at 18:13

## 2023-03-04 RX ADMIN — IOPAMIDOL 75 ML: 612 INJECTION, SOLUTION INTRAVENOUS at 18:15

## 2023-03-04 ASSESSMENT — ENCOUNTER SYMPTOMS
SHORTNESS OF BREATH: 0
SORE THROAT: 0
EYE DISCHARGE: 0
ABDOMINAL DISTENTION: 0
ABDOMINAL PAIN: 0
COLOR CHANGE: 0
RHINORRHEA: 0
CONSTIPATION: 0

## 2023-03-04 NOTE — ED PROVIDER NOTES
[facility] ED  eMERGENCY dEPARTMENT eNCOUnter      Pt Name: Andreea Gill  MRN: [id number]  [place] 1970  Date of evaluation: 3/4/2023  Provider: Rikki Bergeron PA-C    CHIEF COMPLAINT       Chief Complaint   Patient presents with    Altered Mental Status         HISTORY OF PRESENT ILLNESS   (Location/Symptom, Timing/Onset,Context/Setting, Quality, Duration, Modifying Factors, Severity)  Note limiting factors. Andreea Gill is a 46 y.o. female who presents to the emergency department as altered mental status. According to EMS patient was in her home in her kitchen cooking,  states that she passed out, and when he went to check on her she was not responsive so he initiated CPR on her. Police were on scene prior to EMS, and they had continued CPR. On EMS arrival they state patient was unresponsive, but did have a strong pulse, as well as strong blood pressure. CPR was discontinued, they gave patient 2 mg of Narcan without any significant change, blood glucose level according to EMS was within normal range. Patient remains unresponsive on arrival to the ED, she does not verbalize, she does not spontaneously move any extremities she has no visible signs of injuries. Past medical history per chart review hypertension. HPI    NursingNotes were reviewed. REVIEW OF SYSTEMS    (2-9 systems for level 4, 10 or more for level 5)     Review of Systems   Constitutional:  Negative for activity change and appetite change. HENT:  Negative for congestion, ear discharge, ear pain, nosebleeds, rhinorrhea and sore throat. Eyes:  Negative for discharge. Respiratory:  Negative for shortness of breath. Cardiovascular:  Negative for chest pain, palpitations and leg swelling. Gastrointestinal:  Negative for abdominal distention, abdominal pain and constipation. Genitourinary:  Negative for difficulty urinating and dysuria. Musculoskeletal:  Negative for arthralgias.    Skin:  Negative for color change. Neurological:  Negative for dizziness, syncope, numbness and headaches. Altered mental status, unresponsive. Psychiatric/Behavioral:  Negative for agitation and confusion. Except as noted above the remainder of the review of systems was reviewed and negative. PAST MEDICAL HISTORY     Past Medical History:   Diagnosis Date    Cough     has a history of cough and was given inhaler    Hypertension          SURGICALHISTORY       Past Surgical History:   Procedure Laterality Date    CARPAL TUNNEL RELEASE Bilateral     COLONOSCOPY N/A 6/8/2021    COLONOSCOPY DIAGNOSTIC performed by Luz Elena Rice MD at [address]       Previous Medications    BUPROPION (WELLBUTRIN XL) 150 MG EXTENDED RELEASE TABLET        CYCLOBENZAPRINE (FLEXERIL) 10 MG TABLET        DIVALPROEX (DEPAKOTE) 500 MG DR TABLET        GABAPENTIN (NEURONTIN) 800 MG TABLET        LISINOPRIL (PRINIVIL;ZESTRIL) 5 MG TABLET        MELOXICAM (MOBIC) 7.5 MG TABLET    Take 1 tablet by mouth daily    OMEPRAZOLE (PRILOSEC) 40 MG DELAYED RELEASE CAPSULE    Take 1 capsule by mouth every morning (before breakfast)    RISPERIDONE (RISPERDAL) 0.5 MG TABLET    Take 0.5 mg by mouth 2 times daily    TRAZODONE (DESYREL) 50 MG TABLET    Take 50 mg by mouth nightly            Patient has no known allergies.     FAMILY HISTORY       Family History   Problem Relation Age of Onset    Colon Cancer Brother     Breast Cancer Sister           SOCIAL HISTORY       Social History     Socioeconomic History    Marital status: Single   Tobacco Use    Smoking status: Never    Smokeless tobacco: Never   Substance and Sexual Activity    Alcohol use: Not Currently    Drug use: Yes     Comment: last use sunday 5/23/21       SCREENINGS             PHYSICAL EXAM    (up to 7 for level 4, 8 or more for level 5)     ED Triage Vitals   BP Temp Temp src Pulse Resp SpO2 Height Weight   -- -- -- -- -- -- -- --       Physical Exam  Vitals and nursing note reviewed. Constitutional:       General: She is not in acute distress. Appearance: She is well-developed. She is not ill-appearing, toxic-appearing or diaphoretic. HENT:      Head: Normocephalic and atraumatic. Comments: No visible signs of injuries, no cut scrapes abrasions, no depressions, no deformity, no pain on palpation. Nose: No congestion. Mouth/Throat:      Mouth: Mucous membranes are moist.      Pharynx: No oropharyngeal exudate or posterior oropharyngeal erythema. Eyes:      Extraocular Movements: Extraocular movements intact. Conjunctiva/sclera: Conjunctivae normal.      Pupils: Pupils are equal, round, and reactive to light. Comments: Pupils are equal round reactive light approximately 4 mm, no nystagmus. Neck:      Vascular: No JVD. Trachea: No tracheal deviation. Comments: Neck is supple, there is no midline tenderness, no step-off, no crepitus, no deformity  Cardiovascular:      Rate and Rhythm: Normal rate. Pulses: Normal pulses. Heart sounds: Normal heart sounds. No murmur heard. No friction rub. No gallop. Pulmonary:      Effort: Pulmonary effort is normal. No tachypnea, accessory muscle usage, respiratory distress or retractions. Breath sounds: Normal breath sounds. No stridor. No wheezing, rhonchi or rales. Comments: Lung sounds are clear in all fields, there is no wheezes rales or rhonchi, no excess muscle use, no retractions, room air saturations are 98%  Chest:      Chest wall: No tenderness. Abdominal:      General: Abdomen is flat. Bowel sounds are normal. There is no distension or abdominal bruit. Palpations: There is no shifting dullness, fluid wave, hepatomegaly, splenomegaly, mass or pulsatile mass. Tenderness: There is no abdominal tenderness. There is no right CVA tenderness, left CVA tenderness, guarding or rebound. Negative signs include Ruff's sign, Rovsing's sign and McBurney's sign. Comments: Abdomen soft nondistended nontender no guarding mass rebound, no CVA tenderness. No bruising, no prescription previously noted. Musculoskeletal:         General: No deformity. Cervical back: Normal range of motion and neck supple. No rigidity. Comments: Patient not moving extremities spontaneously. Negative Babinski's response. Patient with arm raise, falls back to the bed. She does not make any attempt to hold upper extremities. Skin:     General: Skin is warm and dry. Capillary Refill: Capillary refill takes less than 2 seconds. Coloration: Skin is not jaundiced. Neurological:      General: No focal deficit present. Cranial Nerves: No cranial nerve deficit. Sensory: No sensory deficit. Motor: No weakness. Coordination: Coordination normal.      Comments: Patient appears in no acute distress, she does not follow commands, she is not moving extremities spontaneously. She is unable to hold up extremities when raised and released.    Psychiatric:         Mood and Affect: Mood normal.       RESULTS     EKG: All EKG's are interpreted by the Emergency Department Physician who either signs or Co-signsthis chart in the absence of a cardiologist.    EKG shows normal sinus rhythm at 72 bpm there is no acute ST segment abnormality no ventricular ectopy QTc 459 ms    RADIOLOGY:   Non-plain filmimages such as CT, Ultrasound and MRI are read by the radiologist. Plain radiographic images are visualized and preliminarily interpreted by the emergency physician with the below findings:    ***    Interpretation per the Radiologist below, if available at the time ofthis note:    CT Head W/O Contrast    (Results Pending)   XR CHEST PORTABLE    (Results Pending)   CTA HEAD W WO CONTRAST    (Results Pending)   CTA [address]    (Results Pending)         ED BEDSIDE ULTRASOUND:   Performed by ED Physician - none    LABS:  Labs Reviewed   CBC WITH AUTO DIFFERENTIAL   COMPREHENSIVE METABOLIC PANEL   ETHANOL   URINE DRUG SCREEN   MAGNESIUM   TROPONIN   CK   PROTIME-INR   APTT   URINALYSIS   POCT GLUCOSE   POC PREGNANCY UR-QUAL       All other labs were within normal range or not returned as of this dictation. EMERGENCY DEPARTMENT COURSE and DIFFERENTIAL DIAGNOSIS/MDM:   Vitals: There were no vitals filed for this visit. ***     Medical Decision Making  Patient came into the emergency department as unresponsive by EMS, and remained so on arrival to the ED, blood glucose level is 138 mg/dL, she appears in no acute distress, but is not responsive to verbal or physical stimuli. Family is not present with her as they were not on her arrival to the ED. Speak with her son and her  states that patient had similar episodes before in the past of syncope, but has never been prolonged usually for 5 to 15 minutes in duration, she has been seen by her family doctor, by cardiology without acute findings. He states last evening patient had done some cocaine which they state she does routinely does. Amount and/or Complexity of Data Reviewed  Labs: ordered. Radiology: ordered. ECG/medicine tests: ordered. Risk  Prescription drug management. Coding       Patient retains full responsiveness. She is answering all orientation questions correctly. She is alert and awake. Per  at the bedside she is completely at her baseline. CTA chest demonstrates some mild cardiomegaly with bilateral pleural effusions. Patient is following with a cardiologist at [facility]. She recently had an echocardiogram done 11/2022 which did not demonstrate evidence of CHF. She is not short of breath, has not been hypoxic, not respiratory distress, no increased work of breathing. Patient had also been evaluated in the emergency department by attending Dr. Cristhian Morris.   Patient evaluated after she returned to are noted. There is cardiomegaly with   atelectasis/infiltrates and pleural effusion in the lung bases. Impression      No acute fractures. Atelectasis/infiltrates and pleural effusion in lung bases which may be due   to mild CHF and or pneumonia. CT THORACIC SPINE WO CONTRAST   Final Result   No acute fractures. Advanced degenerative changes at L5-S1 with the mild   central disc bulge. CT thoracic spine. There is no acute fracture or dislocation in the thoracic spine. Mild   diffuse degenerative changes are noted. There is cardiomegaly with   atelectasis/infiltrates and pleural effusion in the lung bases. Impression      No acute fractures. Atelectasis/infiltrates and pleural effusion in lung bases which may be due   to mild CHF and or pneumonia. CT HEAD WO CONTRAST   Final Result   No acute intracranial abnormality. XR CHEST PORTABLE   Final Result   Mild increased markings with small pleural effusions and mild cardiomegaly   suspicious for mild pulmonary edema.                ED BEDSIDE ULTRASOUND:   Performed by ED Physician - none    LABS:  Labs Reviewed   CBC WITH AUTO DIFFERENTIAL - Abnormal; Notable for the following components:       Result Value    RBC 4.11 (*)     All other components within normal limits   COMPREHENSIVE METABOLIC PANEL - Abnormal; Notable for the following components:    Glucose 154 (*)     Creatinine 1.22 (*)     Est, Glom Filt Rate 52.7 (*)     All other components within normal limits   URINE DRUG SCREEN - Abnormal; Notable for the following components:    Cocaine Metabolite Screen, Urine POSITIVE (*)     All other components within normal limits   URINALYSIS - Abnormal; Notable for the following components:    Ketones, Urine TRACE (*)     Protein,  (*)     All other components within normal limits   POCT ARTERIAL - Abnormal; Notable for the following components:    POC Glucose 143 (*)     Est, Glom Filt Rate 60 (*) pO2, Arterial 68 (*)     O2 Sat, Arterial 93 (*)     Lactate 2.04 (*)     All other components within normal limits   POCT GLUCOSE - Abnormal; Notable for the following components:    POC Glucose 138 (*)     All other components within normal limits   POCT VENOUS - Abnormal; Notable for the following components:    POC Creatinine 1.4 (*)     Est, Glom Filt Rate 45 (*)     All other components within normal limits   POCT GLUCOSE - Normal   ETHANOL   MAGNESIUM   TROPONIN   CK   PROTIME-INR   APTT   MICROSCOPIC URINALYSIS   POCT EPOC BLOOD GAS, LACTIC ACID, ICA   POC PREGNANCY UR-QUAL       All other labs were within normal range or not returned as of this dictation. EMERGENCY DEPARTMENT COURSE and DIFFERENTIAL DIAGNOSIS/MDM:   Vitals:    Vitals:    03/04/23 1845 03/04/23 1846 03/04/23 1900 03/04/23 1915   BP: 136/73 136/73 128/72 128/80   Pulse: 92 98 78 75   Resp: 21 22 29 14   Temp:       TempSrc:       SpO2:       Weight:       Height:                Medical Decision Making  Patient came into the emergency department as unresponsive by EMS, and remained so on arrival to the ED, blood glucose level is 138 mg/dL, she appears in no acute distress, but is not responsive to verbal or physical stimuli. Family is not present with her as they were not on her arrival to the ED. Speak with her son and her  states that patient had similar episodes before in the past of syncope, but has never been prolonged usually for 5 to 15 minutes in duration, she has been seen by her family doctor, by cardiology without acute findings. He states last evening patient had done some cocaine which they state she does routinely does. Amount and/or Complexity of Data Reviewed  Labs: ordered. Radiology: ordered. ECG/medicine tests: ordered. Risk  Prescription drug management. Coding       Patient regains full responsiveness. She is answering all orientation questions correctly. She is alert and awake.   Per  at the bedside she is completely at her baseline. CTA chest demonstrates some mild cardiomegaly with bilateral pleural effusions. Patient is following with a cardiologist at [facility]. She recently had an echocardiogram done 11/2022 which did not demonstrate evidence of CHF. She is not short of breath, has not been hypoxic, not respiratory distress, no increased work of breathing. Remaining post traumatic workup unremarkable. UDS positive for cocaine which may be contributing to presentation. Patient had also been evaluated in the emergency department by attending Dr. Kasia Liu. Patient evaluated after she returned to [facility]. Recommending appropriate for discharge home. Admission was offered to patient and  by me due to nature of presentation and symptoms but they declined.  will be with patient all night tonight for close monitoring which was discussed. She will be discharged in stable condition at her mental baseline. CONSULTS:  None    PROCEDURES:  Unless otherwise noted below, none     Procedures    FINAL IMPRESSION      1. Syncope and collapse    2. Cocaine use    3.  Pleural effusion          DISPOSITION/PLAN   DISPOSITION Decision To Discharge 03/04/2023 08:52:23 PM      PATIENT REFERRED TO:  Zina Hua DO  [address] [place] [address]  [phone]    Schedule an appointment as soon as possible for a visit in 2 days      Moncho Mendez MD  [address] [place] [address]    Schedule an appointment as soon as possible for a visit in 1 week      DISCHARGE MEDICATIONS:  Discharge Medication List as of 3/4/2023  8:54 PM             (Please note that portions of this note were completed with a voice recognition program.  Efforts were made to edit the dictations but occasionally words are mis-transcribed.)    ALVARO Herr (electronically signed)  Attending Emergency Physician         Kranthi Herr  03/10/23 0367

## 2023-03-04 NOTE — ED TRIAGE NOTES
Pt presents to ER from home with syncopal episode. Pt passed out while cooking, pt was given narcan and did not awake patient at all by EMS. Pt is not responding to painful stimulus at this time.

## 2023-03-10 LAB
EKG ATRIAL RATE: 72 BPM
EKG P AXIS: 45 DEGREES
EKG P-R INTERVAL: 126 MS
EKG Q-T INTERVAL: 420 MS
EKG QRS DURATION: 88 MS
EKG QTC CALCULATION (BAZETT): 459 MS
EKG R AXIS: 38 DEGREES
EKG T AXIS: 65 DEGREES
EKG VENTRICULAR RATE: 72 BPM

## 2023-03-29 ENCOUNTER — HOSPITAL ENCOUNTER (EMERGENCY)
Age: 53
Discharge: HOME | End: 2023-03-29
Attending: EMERGENCY MEDICINE
Payer: COMMERCIAL

## 2023-03-29 VITALS
HEART RATE: 92 BPM | RESPIRATION RATE: 18 BRPM | WEIGHT: 180 LBS | TEMPERATURE: 98.6 F | SYSTOLIC BLOOD PRESSURE: 122 MMHG | BODY MASS INDEX: 30.73 KG/M2 | OXYGEN SATURATION: 96 % | HEIGHT: 64 IN | DIASTOLIC BLOOD PRESSURE: 71 MMHG

## 2023-03-29 DIAGNOSIS — J40 BRONCHITIS: Primary | ICD-10-CM

## 2023-03-29 PROCEDURE — 99283 EMERGENCY DEPT VISIT LOW MDM: CPT

## 2023-03-29 PROCEDURE — 6370000000 HC RX 637 (ALT 250 FOR IP): Performed by: EMERGENCY MEDICINE

## 2023-03-29 RX ORDER — PREDNISONE 20 MG/1
40 TABLET ORAL ONCE
Status: COMPLETED | OUTPATIENT
Start: 2023-03-29 | End: 2023-03-29

## 2023-03-29 RX ORDER — AZITHROMYCIN 500 MG/1
500 TABLET, FILM COATED ORAL ONCE
Status: COMPLETED | OUTPATIENT
Start: 2023-03-29 | End: 2023-03-29

## 2023-03-29 RX ORDER — BENZONATATE 200 MG/1
200 CAPSULE ORAL 3 TIMES DAILY PRN
Qty: 20 CAPSULE | Refills: 0 | Status: SHIPPED | OUTPATIENT
Start: 2023-03-29 | End: 2023-04-05

## 2023-03-29 RX ORDER — AZITHROMYCIN 250 MG/1
TABLET, FILM COATED ORAL
Qty: 4 TABLET | Refills: 0 | Status: SHIPPED | OUTPATIENT
Start: 2023-03-29 | End: 2023-04-08

## 2023-03-29 RX ORDER — BENZONATATE 100 MG/1
200 CAPSULE ORAL ONCE
Status: COMPLETED | OUTPATIENT
Start: 2023-03-29 | End: 2023-03-29

## 2023-03-29 RX ORDER — PREDNISONE 20 MG/1
40 TABLET ORAL DAILY
Qty: 10 TABLET | Refills: 0 | Status: SHIPPED | OUTPATIENT
Start: 2023-03-29 | End: 2023-04-03

## 2023-03-29 RX ADMIN — BENZONATATE 200 MG: 100 CAPSULE ORAL at 21:37

## 2023-03-29 RX ADMIN — AZITHROMYCIN MONOHYDRATE 500 MG: 500 TABLET ORAL at 21:37

## 2023-03-29 RX ADMIN — PREDNISONE 40 MG: 20 TABLET ORAL at 21:37

## 2023-03-29 ASSESSMENT — ENCOUNTER SYMPTOMS
EYE DISCHARGE: 0
CHEST TIGHTNESS: 0
NAUSEA: 0
VOMITING: 0
ABDOMINAL PAIN: 0
SHORTNESS OF BREATH: 0
ABDOMINAL DISTENTION: 0
WHEEZING: 0
COUGH: 1
PHOTOPHOBIA: 0
SORE THROAT: 0

## 2023-03-29 ASSESSMENT — PAIN DESCRIPTION - DESCRIPTORS: DESCRIPTORS: ACHING

## 2023-03-29 ASSESSMENT — PAIN SCALES - GENERAL: PAINLEVEL_OUTOF10: 8

## 2023-03-29 ASSESSMENT — PAIN DESCRIPTION - ONSET: ONSET: ON-GOING

## 2023-03-29 ASSESSMENT — PAIN DESCRIPTION - LOCATION: LOCATION: HEAD

## 2023-03-29 ASSESSMENT — PAIN - FUNCTIONAL ASSESSMENT: PAIN_FUNCTIONAL_ASSESSMENT: 0-10

## 2023-03-29 ASSESSMENT — PAIN DESCRIPTION - FREQUENCY: FREQUENCY: CONTINUOUS

## 2023-03-30 NOTE — ED PROVIDER NOTES
[address] [facility] ED  eMERGENCY dEPARTMENT eNCOUnter      Pt Name: Aidan Caballero  MRN: [id number]  [place] 1970  Date of evaluation: 3/29/2023  Provider: Rex Barker MD    CHIEF COMPLAINT       Chief Complaint   Patient presents with    Cough     Headache x's 1 week         HISTORY OF PRESENT ILLNESS   (Location/Symptom, Timing/Onset,Context/Setting, Quality, Duration, Modifying Factors, Severity)  Note limiting factors. Aidan Caballero is a 46 y.o. female who presents to the emergency department with cough, sore throat and mild headache. Patient's symptoms going on for 1 week. No associated rash. No abdominal pain. No chest pain. HPI    NursingNotes were reviewed. REVIEW OF SYSTEMS    (2-9 systems for level 4, 10 or more for level 5)     Review of Systems   Constitutional:  Negative for chills, diaphoresis and fatigue. HENT:  Negative for congestion, ear pain, mouth sores and sore throat. Eyes:  Negative for photophobia and discharge. Respiratory:  Positive for cough. Negative for chest tightness, shortness of breath and wheezing. Cardiovascular:  Negative for chest pain and palpitations. Gastrointestinal:  Negative for abdominal distention, abdominal pain, nausea and vomiting. Endocrine: Negative for cold intolerance. Genitourinary:  Negative for difficulty urinating. Musculoskeletal:  Negative for arthralgias and gait problem. Skin:  Negative for pallor and rash. Allergic/Immunologic: Negative for immunocompromised state. Neurological:  Negative for dizziness and syncope. Hematological:  Negative for adenopathy. Psychiatric/Behavioral:  Negative for agitation and hallucinations. All other systems reviewed and are negative. Except as noted above the remainder of the review of systems was reviewed and negative.        PAST MEDICAL HISTORY     Past Medical History:   Diagnosis Date    Cough     has a history of cough and was given inhaler    Hypertension Decision To Discharge 03/29/2023 09:55:17 PM      PATIENT REFERRED TO:  Isabella Gunter DO  [address] [place] [address]  [phone]    In 1 week      DISCHARGE MEDICATIONS:  New Prescriptions    AZITHROMYCIN (ZITHROMAX) 250 MG TABLET    2 TABS DAY 1 given in ER      THEN 1 TAB DAYS 2-5    BENZONATATE (TESSALON) 200 MG CAPSULE    Take 1 capsule by mouth 3 times daily as needed for Cough    PREDNISONE (DELTASONE) 20 MG TABLET    Take 2 tablets by mouth daily for 5 doses          (Please note that portions of this note were completed with a voice recognition program.  Efforts were made to edit the dictations but occasionally words are mis-transcribed.)    Susan Pearson MD (electronically signed)  Attending Emergency Physician         Susan Pearson MD  03/29/23 2150       Susan Pearson MD  03/29/23 3424

## 2023-03-30 NOTE — ED NOTES
Pt medicated prior to dc  Dc instructions education and scripts  Up with steady gait       Sanchez [place], MALICK  03/29/23 0975

## 2023-05-17 LAB
CHOLESTEROL (MG/DL) IN SER/PLAS: 157 MG/DL (ref 0–199)
CHOLESTEROL IN HDL (MG/DL) IN SER/PLAS: 66.3 MG/DL
CHOLESTEROL/HDL RATIO: 2.4
CHOLESTEROL/HDL RATIO: 2.4
CHOLESTEROL: 157 MG/DL (ref 0–199)
HDLC SERPL-MCNC: 66.3 MG/DL
LDL CHOLESTEROL: 73 MG/DL (ref 0–99)
LDL: 73 MG/DL (ref 0–99)
TRIGL SERPL-MCNC: 89 MG/DL (ref 0–149)
TRIGLYCERIDE (MG/DL) IN SER/PLAS: 89 MG/DL (ref 0–149)
VLDL: 18 MG/DL (ref 0–40)
VLDLC SERPL CALC-MCNC: 18 MG/DL (ref 0–40)

## 2023-10-26 ENCOUNTER — PREP FOR PROCEDURE (OUTPATIENT)
Dept: GASTROENTEROLOGY | Age: 53
End: 2023-10-26

## 2023-10-26 ENCOUNTER — OFFICE VISIT (OUTPATIENT)
Dept: GASTROENTEROLOGY | Age: 53
End: 2023-10-26
Payer: COMMERCIAL

## 2023-10-26 VITALS — OXYGEN SATURATION: 98 % | HEART RATE: 71 BPM

## 2023-10-26 DIAGNOSIS — R12 HEARTBURN: Primary | ICD-10-CM

## 2023-10-26 DIAGNOSIS — R12 HEARTBURN: ICD-10-CM

## 2023-10-26 PROCEDURE — 3017F COLORECTAL CA SCREEN DOC REV: CPT | Performed by: NURSE PRACTITIONER

## 2023-10-26 PROCEDURE — G8417 CALC BMI ABV UP PARAM F/U: HCPCS | Performed by: NURSE PRACTITIONER

## 2023-10-26 PROCEDURE — 1036F TOBACCO NON-USER: CPT | Performed by: NURSE PRACTITIONER

## 2023-10-26 PROCEDURE — G8484 FLU IMMUNIZE NO ADMIN: HCPCS | Performed by: NURSE PRACTITIONER

## 2023-10-26 PROCEDURE — 99213 OFFICE O/P EST LOW 20 MIN: CPT | Performed by: NURSE PRACTITIONER

## 2023-10-26 PROCEDURE — G8427 DOCREV CUR MEDS BY ELIG CLIN: HCPCS | Performed by: NURSE PRACTITIONER

## 2023-10-26 RX ORDER — ASPIRIN 81 MG/1
TABLET, COATED ORAL
COMMUNITY
Start: 2023-10-05

## 2023-10-26 RX ORDER — PALIPERIDONE 9 MG/1
TABLET, EXTENDED RELEASE ORAL
COMMUNITY
Start: 2023-10-07

## 2023-10-26 RX ORDER — FLUOXETINE HYDROCHLORIDE 20 MG/1
CAPSULE ORAL
COMMUNITY
Start: 2023-10-19

## 2023-10-26 RX ORDER — DIVALPROEX SODIUM 250 MG/1
TABLET, EXTENDED RELEASE ORAL
COMMUNITY
Start: 2023-10-07

## 2023-10-26 RX ORDER — DIVALPROEX SODIUM 500 MG/1
TABLET, EXTENDED RELEASE ORAL
COMMUNITY
Start: 2023-10-11

## 2023-10-26 RX ORDER — BENZTROPINE MESYLATE 1 MG/1
TABLET ORAL
COMMUNITY
Start: 2023-10-24

## 2023-10-26 ASSESSMENT — ENCOUNTER SYMPTOMS
CONSTIPATION: 0
NAUSEA: 0
ABDOMINAL DISTENTION: 0
BLOOD IN STOOL: 0
DIARRHEA: 0
VOMITING: 1
ABDOMINAL PAIN: 1
RECTAL PAIN: 0
ANAL BLEEDING: 0
CHEST TIGHTNESS: 0
WHEEZING: 0
VOICE CHANGE: 0
PHOTOPHOBIA: 0
COLOR CHANGE: 0
EYE PAIN: 0
TROUBLE SWALLOWING: 1
EYE REDNESS: 0
SHORTNESS OF BREATH: 0

## 2023-10-26 NOTE — PROGRESS NOTES
Normal range of motion. Cervical back: Neck supple. Right lower leg: No edema. Left lower leg: No edema. Skin:     General: Skin is warm and dry. Capillary Refill: Capillary refill takes less than 2 seconds. Coloration: Skin is not jaundiced. Findings: No erythema or rash. Neurological:      General: No focal deficit present. Mental Status: She is alert and oriented to person, place, and time. Psychiatric:         Mood and Affect: Mood normal.         Behavior: Behavior normal.         Laboratory, Pathology, Radiology reviewed in detail with relevantimportant investigations summarized below:  Lab Results   Component Value Date/Time    WBC 5.0 04/19/2023 10:43 AM    WBC 8.5 03/04/2023 04:30 PM    WBC 7.8 10/25/2022 09:30 PM    WBC 5.6 05/20/2021 01:49 PM    HGB 13.1 04/19/2023 10:43 AM    HGB 12.5 03/04/2023 04:44 PM    HGB 12.7 03/04/2023 04:30 PM    HGB 12.8 10/25/2022 09:30 PM    HGB 13.0 05/20/2021 01:49 PM    HCT 39.3 04/19/2023 10:43 AM    HCT 38.2 03/04/2023 04:30 PM    HCT 37.6 10/25/2022 09:30 PM    HCT 38.5 05/20/2021 01:49 PM    MCV 92.0 04/19/2023 10:43 AM    MCV 93.0 03/04/2023 04:30 PM    MCV 91.2 10/25/2022 09:30 PM    MCV 95.5 05/20/2021 01:49 PM     04/19/2023 10:43 AM     03/04/2023 04:30 PM     10/25/2022 09:30 PM     05/20/2021 01:49 PM    .  Lab Results   Component Value Date/Time    ALT 13 04/19/2023 10:45 AM    ALT 10 03/04/2023 04:30 PM    ALT 13 10/25/2022 09:30 PM    AST 16 04/19/2023 10:45 AM    AST 17 03/04/2023 04:30 PM    AST 15 10/25/2022 09:30 PM    ALKPHOS 83 04/19/2023 10:45 AM    ALKPHOS 72 03/04/2023 04:30 PM    ALKPHOS 103 10/25/2022 09:30 PM    BILITOT 0.3 04/19/2023 10:45 AM    BILITOT 0.4 03/04/2023 04:30 PM    BILITOT <0.2 10/25/2022 09:30 PM       No results found.   No results found for: \"IRON\", \"TIBC\", \"FERRITIN\"  Lab Results   Component Value Date/Time    INR 1.1 03/04/2023 04:30 PM    INR 1.0 05/20/2021

## 2023-10-27 RX ORDER — SODIUM CHLORIDE 9 MG/ML
INJECTION, SOLUTION INTRAVENOUS PRN
Status: CANCELLED | OUTPATIENT
Start: 2023-10-27

## 2023-10-27 RX ORDER — SODIUM CHLORIDE 9 MG/ML
INJECTION, SOLUTION INTRAVENOUS CONTINUOUS
Status: CANCELLED | OUTPATIENT
Start: 2023-10-27

## 2023-10-27 RX ORDER — SODIUM CHLORIDE 0.9 % (FLUSH) 0.9 %
5-40 SYRINGE (ML) INJECTION EVERY 12 HOURS SCHEDULED
Status: CANCELLED | OUTPATIENT
Start: 2023-10-27

## 2023-10-31 ENCOUNTER — HOSPITAL ENCOUNTER (OUTPATIENT)
Age: 53
Setting detail: OUTPATIENT SURGERY
Discharge: HOME | End: 2023-10-31
Attending: SPECIALIST | Admitting: SPECIALIST
Payer: COMMERCIAL

## 2023-10-31 ENCOUNTER — ANESTHESIA (OUTPATIENT)
Dept: ENDOSCOPY | Age: 53
End: 2023-10-31
Payer: COMMERCIAL

## 2023-10-31 ENCOUNTER — ANESTHESIA EVENT (OUTPATIENT)
Dept: ENDOSCOPY | Age: 53
End: 2023-10-31
Payer: COMMERCIAL

## 2023-10-31 VITALS
SYSTOLIC BLOOD PRESSURE: 110 MMHG | OXYGEN SATURATION: 96 % | RESPIRATION RATE: 16 BRPM | WEIGHT: 146 LBS | BODY MASS INDEX: 24.92 KG/M2 | HEIGHT: 64 IN | HEART RATE: 64 BPM | DIASTOLIC BLOOD PRESSURE: 63 MMHG | TEMPERATURE: 97.4 F

## 2023-10-31 DIAGNOSIS — R13.19 ESOPHAGEAL DYSPHAGIA: Primary | ICD-10-CM

## 2023-10-31 DIAGNOSIS — R12 HEARTBURN: ICD-10-CM

## 2023-10-31 PROCEDURE — 6370000000 HC RX 637 (ALT 250 FOR IP): Performed by: SPECIALIST

## 2023-10-31 PROCEDURE — 88342 IMHCHEM/IMCYTCHM 1ST ANTB: CPT

## 2023-10-31 PROCEDURE — 2580000003 HC RX 258: Performed by: SPECIALIST

## 2023-10-31 PROCEDURE — 3609017100 HC EGD: Performed by: SPECIALIST

## 2023-10-31 PROCEDURE — 7100000010 HC PHASE II RECOVERY - FIRST 15 MIN: Performed by: SPECIALIST

## 2023-10-31 PROCEDURE — 7100000011 HC PHASE II RECOVERY - ADDTL 15 MIN: Performed by: SPECIALIST

## 2023-10-31 PROCEDURE — 88305 TISSUE EXAM BY PATHOLOGIST: CPT

## 2023-10-31 PROCEDURE — 6360000002 HC RX W HCPCS: Performed by: NURSE ANESTHETIST, CERTIFIED REGISTERED

## 2023-10-31 PROCEDURE — 43239 EGD BIOPSY SINGLE/MULTIPLE: CPT | Performed by: SPECIALIST

## 2023-10-31 PROCEDURE — 2709999900 HC NON-CHARGEABLE SUPPLY: Performed by: SPECIALIST

## 2023-10-31 PROCEDURE — 3700000000 HC ANESTHESIA ATTENDED CARE: Performed by: SPECIALIST

## 2023-10-31 PROCEDURE — 2580000003 HC RX 258

## 2023-10-31 RX ORDER — SODIUM CHLORIDE 0.9 % (FLUSH) 0.9 %
5-40 SYRINGE (ML) INJECTION PRN
Status: CANCELLED | OUTPATIENT
Start: 2023-10-31

## 2023-10-31 RX ORDER — SODIUM CHLORIDE 9 MG/ML
INJECTION, SOLUTION INTRAVENOUS PRN
Status: DISCONTINUED | OUTPATIENT
Start: 2023-10-31 | End: 2023-10-31 | Stop reason: HOSPADM

## 2023-10-31 RX ORDER — SODIUM CHLORIDE 0.9 % (FLUSH) 0.9 %
5-40 SYRINGE (ML) INJECTION EVERY 12 HOURS SCHEDULED
Status: DISCONTINUED | OUTPATIENT
Start: 2023-10-31 | End: 2023-10-31 | Stop reason: HOSPADM

## 2023-10-31 RX ORDER — SODIUM CHLORIDE 9 MG/ML
INJECTION, SOLUTION INTRAVENOUS CONTINUOUS
Status: DISCONTINUED | OUTPATIENT
Start: 2023-10-31 | End: 2023-10-31 | Stop reason: HOSPADM

## 2023-10-31 RX ORDER — SODIUM CHLORIDE 9 MG/ML
INJECTION, SOLUTION INTRAVENOUS
Status: COMPLETED
Start: 2023-10-31 | End: 2023-10-31

## 2023-10-31 RX ORDER — PROPOFOL 10 MG/ML
INJECTION, EMULSION INTRAVENOUS PRN
Status: DISCONTINUED | OUTPATIENT
Start: 2023-10-31 | End: 2023-10-31 | Stop reason: SDUPTHER

## 2023-10-31 RX ORDER — SIMETHICONE 20 MG/.3ML
EMULSION ORAL
Status: DISCONTINUED
Start: 2023-10-31 | End: 2023-10-31 | Stop reason: HOSPADM

## 2023-10-31 RX ORDER — SIMETHICONE 20 MG/.3ML
EMULSION ORAL PRN
Status: DISCONTINUED | OUTPATIENT
Start: 2023-10-31 | End: 2023-10-31 | Stop reason: ALTCHOICE

## 2023-10-31 RX ORDER — SODIUM CHLORIDE 0.9 % (FLUSH) 0.9 %
5-40 SYRINGE (ML) INJECTION EVERY 12 HOURS SCHEDULED
Status: CANCELLED | OUTPATIENT
Start: 2023-10-31

## 2023-10-31 RX ORDER — MAGNESIUM HYDROXIDE 1200 MG/15ML
LIQUID ORAL PRN
Status: DISCONTINUED | OUTPATIENT
Start: 2023-10-31 | End: 2023-10-31 | Stop reason: ALTCHOICE

## 2023-10-31 RX ORDER — ONDANSETRON 2 MG/ML
4 INJECTION INTRAMUSCULAR; INTRAVENOUS
Status: CANCELLED | OUTPATIENT
Start: 2023-10-31 | End: 2023-11-01

## 2023-10-31 RX ORDER — SODIUM CHLORIDE 9 MG/ML
INJECTION, SOLUTION INTRAVENOUS PRN
Status: CANCELLED | OUTPATIENT
Start: 2023-10-31

## 2023-10-31 RX ADMIN — SODIUM CHLORIDE: 9 INJECTION, SOLUTION INTRAVENOUS at 08:04

## 2023-10-31 RX ADMIN — PROPOFOL 200 MG: 10 INJECTION, EMULSION INTRAVENOUS at 08:36

## 2023-10-31 ASSESSMENT — PAIN - FUNCTIONAL ASSESSMENT: PAIN_FUNCTIONAL_ASSESSMENT: 0-10

## 2023-10-31 NOTE — ANESTHESIA POSTPROCEDURE EVALUATION
Department of Anesthesiology  Postprocedure Note    Patient: Ever Davis  MRN: [id number]  YOB: 1970  Date of evaluation: 10/31/2023      Procedure Summary     Date: 10/31/23 Room / Location: [address] OR 01 / [address]    Anesthesia Start: 0499 Anesthesia Stop: 0845    Procedure: EGD WITH BIOPSIES Diagnosis:       Heartburn      (Heartburn [R12])    Surgeons: Wesley Minor MD Responsible Provider: CARMINA Santoyo CRNA    Anesthesia Type: MAC ASA Status: 2          Anesthesia Type: No value filed.     Elida Phase I: Elida Score: 10    Elida Phase II:        Anesthesia Post Evaluation    Patient location during evaluation: bedside  Patient participation: complete - patient participated  Level of consciousness: awake and awake and alert  Airway patency: patent  Nausea & Vomiting: no nausea and no vomiting  Complications: no  Cardiovascular status: blood pressure returned to baseline and hemodynamically stable  Respiratory status: acceptable  Hydration status: euvolemic  Pain management: adequate
[de-identified] : X-ray of the hips on 2021 (AP and lateral views) reveals what appears to be bilateral hip subluxation versus dislocation.  The ossific nuclei are not present.\par Indication for x-ray of the hips: To determine the presence of DDH.

## 2023-10-31 NOTE — DISCHARGE INSTRUCTIONS
Upper Discharge Instructions    Patient Name: Ling Liu  Patient ID: [id number]  YOB: 1970  Procedure: Jimcinthya Jonh  Referring Physician: [unfilled]  Procedure Date: 10/31/2023    Recommendations: Follow-up appointment with endoscopist in 2 weeks. Follow-up appointment with family physician in 4 weeks. Reports of your procedure and these recommendations have been sent to:  [email or web address]    Sedative medication given for procedures can slow your reaction time and coordination for many hours. If you receive medications, it is important for your safety to follow the instructions below for the remainder of the day:  BE TAKEN directly home from the center and rest quietly. DO NOT resume normal activities until tomorrow. Do NOT drive, return to work, or operate any machinery or power tools. Do NOT make any important personal or business decisions, sign any legal papers or perform any activity that depends on your full concentration power or mental judgement. Do NOT drink any alcohol or take nerve or sleeping drugs. They add to the effects of the medicine still present in your body. If a biopsy or polyp removal is performed during the procedure, there is a slight risk of bleeding. If you had a biopsy or a polyp removed, we suggest that you follow the instructions below:  Do NOT take aspirin or similar anti-inflammatory medicine for any days. Do NOT exercise, jog, or do any heavy lifting or straining for 1 day. Potential common after effects and treatment following the procedure:  Mild sore throat - treat with throat lozenges and gargle with warm salt water. Mild abdominal pain, bloating, or excessive gas - rest, eat lightly, and use a heating pad. Redness and/or swelling where the IV was - apply heat and elevate. Symptoms to report to your physician:  Severe sore throat or inability to swallow and/or eat usual diet. Chills or fever above 101 degrees occurring within 24 hours after procedure.   Pain

## 2023-11-13 ENCOUNTER — OFFICE VISIT (OUTPATIENT)
Dept: GASTROENTEROLOGY | Age: 53
End: 2023-11-13
Payer: COMMERCIAL

## 2023-11-13 VITALS
DIASTOLIC BLOOD PRESSURE: 70 MMHG | SYSTOLIC BLOOD PRESSURE: 100 MMHG | BODY MASS INDEX: 26.43 KG/M2 | WEIGHT: 154 LBS | OXYGEN SATURATION: 98 % | HEART RATE: 81 BPM

## 2023-11-13 DIAGNOSIS — R11.2 NAUSEA AND VOMITING, UNSPECIFIED VOMITING TYPE: ICD-10-CM

## 2023-11-13 PROCEDURE — 1036F TOBACCO NON-USER: CPT | Performed by: NURSE PRACTITIONER

## 2023-11-13 PROCEDURE — 3017F COLORECTAL CA SCREEN DOC REV: CPT | Performed by: NURSE PRACTITIONER

## 2023-11-13 PROCEDURE — G8427 DOCREV CUR MEDS BY ELIG CLIN: HCPCS | Performed by: NURSE PRACTITIONER

## 2023-11-13 PROCEDURE — G8484 FLU IMMUNIZE NO ADMIN: HCPCS | Performed by: NURSE PRACTITIONER

## 2023-11-13 PROCEDURE — 99213 OFFICE O/P EST LOW 20 MIN: CPT | Performed by: NURSE PRACTITIONER

## 2023-11-13 PROCEDURE — G8417 CALC BMI ABV UP PARAM F/U: HCPCS | Performed by: NURSE PRACTITIONER

## 2023-11-13 ASSESSMENT — ENCOUNTER SYMPTOMS
TROUBLE SWALLOWING: 0
RECTAL PAIN: 0
BLOOD IN STOOL: 0
ABDOMINAL PAIN: 1
EYE REDNESS: 0
COLOR CHANGE: 0
VOICE CHANGE: 0
NAUSEA: 1
CHEST TIGHTNESS: 0
CONSTIPATION: 1
PHOTOPHOBIA: 0
SHORTNESS OF BREATH: 0
VOMITING: 1
ABDOMINAL DISTENTION: 0
ANAL BLEEDING: 0
EYE PAIN: 0
DIARRHEA: 0
WHEEZING: 0

## 2023-11-13 NOTE — PROGRESS NOTES
increases in colonic motility. This is particularly important in the morning when colonic motor activity is highest.  -Start fiber (ex. Metamucil 3.4 g by mouth daily with a glass of water or juice) titrate up to 2-3 times per day as needed.  -Start stool softener, Colace 100mg by mouth daily or 50mg by mouth 2 times per day. Can go up to 100mg 2 times per day if needed to soften stool.  -Start Miralax 17 g of powder dissolved in 8 oz of water once daily and titrate up or down (to a maximum of 34 g daily) to effect. 3.  Nausea, vomiting and bloating  EGD with retained gastric contents, no history of diabetes, noted multiple different psychiatric medications that could be causing delayed gastric emptying also noted that patient is significantly constipated with reporting only 1 bowel movement weekly. -Recommend following regular bowel regimen to see if her symptoms clinically improve  -If no improvement would proceed with ultrasound of the abdomen, HIDA scan, and gastric emptying study  4. Associated medical conditions history of bipolar disorder, hypertension, substance abuse      Return in about 3 months (around 2/13/2024), or if symptoms worsen or fail to improve.       Wesley Burns, APRN - CNP

## 2023-11-19 ENCOUNTER — APPOINTMENT (OUTPATIENT)
Dept: CT IMAGING | Age: 53
End: 2023-11-19
Payer: COMMERCIAL

## 2023-11-19 ENCOUNTER — HOSPITAL ENCOUNTER (EMERGENCY)
Age: 53
Discharge: HOME | End: 2023-11-19
Attending: EMERGENCY MEDICINE
Payer: COMMERCIAL

## 2023-11-19 VITALS
DIASTOLIC BLOOD PRESSURE: 64 MMHG | OXYGEN SATURATION: 94 % | TEMPERATURE: 98.5 F | SYSTOLIC BLOOD PRESSURE: 99 MMHG | RESPIRATION RATE: 16 BRPM | HEART RATE: 81 BPM

## 2023-11-19 DIAGNOSIS — K59.00 CONSTIPATION, UNSPECIFIED CONSTIPATION TYPE: Primary | ICD-10-CM

## 2023-11-19 DIAGNOSIS — K80.20 CALCULUS OF GALLBLADDER WITHOUT CHOLECYSTITIS WITHOUT OBSTRUCTION: ICD-10-CM

## 2023-11-19 LAB
ALBUMIN SERPL-MCNC: 3.9 G/DL (ref 3.5–4.6)
ALP SERPL-CCNC: 88 U/L (ref 40–130)
ALT SERPL-CCNC: 12 U/L (ref 0–33)
ANION GAP SERPL CALCULATED.3IONS-SCNC: 7 MEQ/L (ref 9–15)
AST SERPL-CCNC: 14 U/L (ref 0–35)
BASOPHILS # BLD: 0 K/UL (ref 0–0.2)
BASOPHILS NFR BLD: 0.6 %
BILIRUB SERPL-MCNC: <0.2 MG/DL (ref 0.2–0.7)
BILIRUB UR QL STRIP: NEGATIVE
BUN SERPL-MCNC: 12 MG/DL (ref 6–20)
CALCIUM SERPL-MCNC: 9.2 MG/DL (ref 8.5–9.9)
CHLORIDE SERPL-SCNC: 103 MEQ/L (ref 95–107)
CLARITY UR: ABNORMAL
CO2 SERPL-SCNC: 31 MEQ/L (ref 20–31)
COLOR UR: YELLOW
CREAT SERPL-MCNC: 0.88 MG/DL (ref 0.5–0.9)
EOSINOPHIL # BLD: 0.2 K/UL (ref 0–0.7)
EOSINOPHIL NFR BLD: 2.3 %
ERYTHROCYTE [DISTWIDTH] IN BLOOD BY AUTOMATED COUNT: 12.8 % (ref 11.5–14.5)
GLOBULIN SER CALC-MCNC: 2.8 G/DL (ref 2.3–3.5)
GLUCOSE SERPL-MCNC: 131 MG/DL (ref 70–99)
GLUCOSE UR STRIP-MCNC: NEGATIVE MG/DL
HCT VFR BLD AUTO: 39.8 % (ref 37–47)
HGB BLD-MCNC: 13 G/DL (ref 12–16)
HGB UR QL STRIP: NEGATIVE
KETONES UR STRIP-MCNC: NEGATIVE MG/DL
LEUKOCYTE ESTERASE UR QL STRIP: NEGATIVE
LIPASE SERPL-CCNC: 57 U/L (ref 12–95)
LYMPHOCYTES # BLD: 2.7 K/UL (ref 1–4.8)
LYMPHOCYTES NFR BLD: 41.8 %
MCH RBC QN AUTO: 31.4 PG (ref 27–31.3)
MCHC RBC AUTO-ENTMCNC: 32.7 % (ref 33–37)
MCV RBC AUTO: 96.1 FL (ref 79.4–94.8)
MONOCYTES # BLD: 0.8 K/UL (ref 0.2–0.8)
MONOCYTES NFR BLD: 12 %
NEUTROPHILS # BLD: 2.8 K/UL (ref 1.4–6.5)
NEUTS SEG NFR BLD: 42.8 %
NITRITE UR QL STRIP: NEGATIVE
PH UR STRIP: 7.5 [PH] (ref 5–9)
PLATELET # BLD AUTO: 245 K/UL (ref 130–400)
POTASSIUM SERPL-SCNC: 4.2 MEQ/L (ref 3.4–4.9)
PROT SERPL-MCNC: 6.7 G/DL (ref 6.3–8)
PROT UR STRIP-MCNC: NEGATIVE MG/DL
RBC # BLD AUTO: 4.14 M/UL (ref 4.2–5.4)
SODIUM SERPL-SCNC: 141 MEQ/L (ref 135–144)
SP GR UR STRIP: 1.01 (ref 1–1.03)
URINE REFLEX TO CULTURE: ABNORMAL
UROBILINOGEN UR STRIP-ACNC: 1 E.U./DL
WBC # BLD AUTO: 6.5 K/UL (ref 4.8–10.8)

## 2023-11-19 PROCEDURE — 99284 EMERGENCY DEPT VISIT MOD MDM: CPT

## 2023-11-19 PROCEDURE — 74176 CT ABD & PELVIS W/O CONTRAST: CPT

## 2023-11-19 PROCEDURE — 81003 URINALYSIS AUTO W/O SCOPE: CPT

## 2023-11-19 PROCEDURE — 85025 COMPLETE CBC W/AUTO DIFF WBC: CPT

## 2023-11-19 PROCEDURE — 36415 COLL VENOUS BLD VENIPUNCTURE: CPT

## 2023-11-19 PROCEDURE — 96375 TX/PRO/DX INJ NEW DRUG ADDON: CPT

## 2023-11-19 PROCEDURE — 96374 THER/PROPH/DIAG INJ IV PUSH: CPT

## 2023-11-19 PROCEDURE — 2580000003 HC RX 258: Performed by: EMERGENCY MEDICINE

## 2023-11-19 PROCEDURE — 83690 ASSAY OF LIPASE: CPT

## 2023-11-19 PROCEDURE — 80053 COMPREHEN METABOLIC PANEL: CPT

## 2023-11-19 PROCEDURE — 6370000000 HC RX 637 (ALT 250 FOR IP): Performed by: EMERGENCY MEDICINE

## 2023-11-19 PROCEDURE — 6360000002 HC RX W HCPCS: Performed by: EMERGENCY MEDICINE

## 2023-11-19 RX ORDER — MORPHINE SULFATE 4 MG/ML
4 INJECTION, SOLUTION INTRAMUSCULAR; INTRAVENOUS ONCE
Status: COMPLETED | OUTPATIENT
Start: 2023-11-19 | End: 2023-11-19

## 2023-11-19 RX ORDER — 0.9 % SODIUM CHLORIDE 0.9 %
1000 INTRAVENOUS SOLUTION INTRAVENOUS ONCE
Status: COMPLETED | OUTPATIENT
Start: 2023-11-19 | End: 2023-11-19

## 2023-11-19 RX ORDER — KETOROLAC TROMETHAMINE 15 MG/ML
15 INJECTION, SOLUTION INTRAMUSCULAR; INTRAVENOUS ONCE
Status: COMPLETED | OUTPATIENT
Start: 2023-11-19 | End: 2023-11-19

## 2023-11-19 RX ORDER — ONDANSETRON 2 MG/ML
4 INJECTION INTRAMUSCULAR; INTRAVENOUS ONCE
Status: COMPLETED | OUTPATIENT
Start: 2023-11-19 | End: 2023-11-19

## 2023-11-19 RX ADMIN — MORPHINE SULFATE 4 MG: 4 INJECTION, SOLUTION INTRAMUSCULAR; INTRAVENOUS at 05:49

## 2023-11-19 RX ADMIN — KETOROLAC TROMETHAMINE 15 MG: 15 INJECTION, SOLUTION INTRAMUSCULAR; INTRAVENOUS at 05:49

## 2023-11-19 RX ADMIN — SODIUM CHLORIDE 1000 ML: 9 INJECTION, SOLUTION INTRAVENOUS at 05:49

## 2023-11-19 RX ADMIN — MAGNESIUM CITRATE 296 ML: 1.75 LIQUID ORAL at 08:04

## 2023-11-19 RX ADMIN — ONDANSETRON 4 MG: 2 INJECTION INTRAMUSCULAR; INTRAVENOUS at 05:49

## 2023-11-19 ASSESSMENT — PAIN SCALES - GENERAL
PAINLEVEL_OUTOF10: 7
PAINLEVEL_OUTOF10: 7
PAINLEVEL_OUTOF10: 10

## 2023-11-19 ASSESSMENT — ENCOUNTER SYMPTOMS
WHEEZING: 0
EYE DISCHARGE: 0
ABDOMINAL PAIN: 1
VOMITING: 1
PHOTOPHOBIA: 0
ABDOMINAL DISTENTION: 0
CHEST TIGHTNESS: 0
NAUSEA: 1
SORE THROAT: 0
COUGH: 0
SHORTNESS OF BREATH: 0

## 2023-11-19 ASSESSMENT — PAIN DESCRIPTION - ORIENTATION
ORIENTATION: RIGHT
ORIENTATION: UPPER;RIGHT
ORIENTATION: RIGHT;UPPER

## 2023-11-19 ASSESSMENT — PAIN DESCRIPTION - LOCATION
LOCATION: ABDOMEN

## 2023-11-19 ASSESSMENT — PAIN DESCRIPTION - PAIN TYPE
TYPE: ACUTE PAIN
TYPE: ACUTE PAIN

## 2023-11-19 ASSESSMENT — PAIN DESCRIPTION - ONSET: ONSET: ON-GOING

## 2023-11-19 ASSESSMENT — PAIN - FUNCTIONAL ASSESSMENT: PAIN_FUNCTIONAL_ASSESSMENT: 0-10

## 2023-11-19 ASSESSMENT — PAIN DESCRIPTION - DESCRIPTORS: DESCRIPTORS: DISCOMFORT

## 2023-11-19 ASSESSMENT — PAIN DESCRIPTION - FREQUENCY: FREQUENCY: CONTINUOUS

## 2023-11-19 NOTE — ED NOTES
Amb to bathroom and back with steady gait. IV infusing well. Urine specimen obtained.      Natividad Kwong RN  11/19/23 2283

## 2023-11-19 NOTE — ED NOTES
Pt states she is feeling a little better. Pt still does not have to urinate. Pt aware spec is needed.       Volodymyr Juan RN  11/19/23 [id number]

## 2023-12-19 PROBLEM — R06.02 SHORTNESS OF BREATH: Status: ACTIVE | Noted: 2023-12-19

## 2023-12-19 PROBLEM — R07.9 CHEST PAIN: Status: ACTIVE | Noted: 2023-12-19

## 2023-12-19 PROBLEM — I10 HYPERTENSION: Status: ACTIVE | Noted: 2023-12-19

## 2023-12-19 RX ORDER — PANTOPRAZOLE SODIUM 40 MG/1
1 TABLET, DELAYED RELEASE ORAL DAILY
COMMUNITY

## 2023-12-19 RX ORDER — BUPROPION HYDROCHLORIDE 150 MG/1
1 TABLET, EXTENDED RELEASE ORAL DAILY
COMMUNITY

## 2023-12-19 RX ORDER — LOSARTAN POTASSIUM 25 MG/1
1 TABLET ORAL DAILY
COMMUNITY

## 2023-12-19 RX ORDER — BENZTROPINE MESYLATE 0.5 MG/1
1 TABLET ORAL NIGHTLY
COMMUNITY

## 2023-12-19 RX ORDER — DIVALPROEX SODIUM 250 MG/1
TABLET, DELAYED RELEASE ORAL
COMMUNITY

## 2023-12-19 RX ORDER — GABAPENTIN 800 MG/1
1 TABLET ORAL 2 TIMES DAILY
COMMUNITY

## 2023-12-19 RX ORDER — PALIPERIDONE 3 MG/1
1 TABLET, EXTENDED RELEASE ORAL DAILY
COMMUNITY

## 2023-12-19 RX ORDER — DIVALPROEX SODIUM 500 MG/1
TABLET, DELAYED RELEASE ORAL
COMMUNITY

## 2023-12-19 RX ORDER — ASPIRIN 81 MG/1
1 TABLET ORAL DAILY
COMMUNITY

## 2024-02-13 ENCOUNTER — APPOINTMENT (OUTPATIENT)
Dept: GENERAL RADIOLOGY | Age: 54
End: 2024-02-13
Payer: COMMERCIAL

## 2024-02-13 ENCOUNTER — HOSPITAL ENCOUNTER (EMERGENCY)
Age: 54
Discharge: HOME | End: 2024-02-13
Attending: EMERGENCY MEDICINE
Payer: COMMERCIAL

## 2024-02-13 VITALS
HEIGHT: 64 IN | OXYGEN SATURATION: 93 % | HEART RATE: 75 BPM | DIASTOLIC BLOOD PRESSURE: 83 MMHG | SYSTOLIC BLOOD PRESSURE: 135 MMHG | WEIGHT: 150 LBS | RESPIRATION RATE: 16 BRPM | TEMPERATURE: 98.3 F | BODY MASS INDEX: 25.61 KG/M2

## 2024-02-13 DIAGNOSIS — R10.84 GENERALIZED ABDOMINAL PAIN: Primary | ICD-10-CM

## 2024-02-13 LAB
ALBUMIN SERPL-MCNC: 4.1 G/DL (ref 3.5–4.6)
ALP SERPL-CCNC: 80 U/L (ref 40–130)
ALT SERPL-CCNC: 16 U/L (ref 0–33)
AMYLASE SERPL-CCNC: 78 U/L (ref 22–93)
ANION GAP SERPL CALCULATED.3IONS-SCNC: 15 MEQ/L (ref 9–15)
AST SERPL-CCNC: 16 U/L (ref 0–35)
BACTERIA URNS QL MICRO: NEGATIVE /HPF
BASOPHILS # BLD: 0.1 K/UL (ref 0–0.2)
BASOPHILS NFR BLD: 0.5 %
BILIRUB SERPL-MCNC: <0.2 MG/DL (ref 0.2–0.7)
BILIRUB UR QL STRIP: NEGATIVE
BUN SERPL-MCNC: 12 MG/DL (ref 6–20)
CALCIUM SERPL-MCNC: 9.2 MG/DL (ref 8.5–9.9)
CHLORIDE SERPL-SCNC: 99 MEQ/L (ref 95–107)
CLARITY UR: CLEAR
CO2 SERPL-SCNC: 27 MEQ/L (ref 20–31)
COLOR UR: YELLOW
CREAT SERPL-MCNC: 0.73 MG/DL (ref 0.5–0.9)
EOSINOPHIL # BLD: 0.1 K/UL (ref 0–0.7)
EOSINOPHIL NFR BLD: 0.4 %
EPI CELLS #/AREA URNS AUTO: ABNORMAL /HPF (ref 0–5)
ERYTHROCYTE [DISTWIDTH] IN BLOOD BY AUTOMATED COUNT: 12.6 % (ref 11.5–14.5)
GLOBULIN SER CALC-MCNC: 2.8 G/DL (ref 2.3–3.5)
GLUCOSE SERPL-MCNC: 136 MG/DL (ref 70–99)
GLUCOSE UR STRIP-MCNC: NEGATIVE MG/DL
HCT VFR BLD AUTO: 39 % (ref 37–47)
HGB BLD-MCNC: 13 G/DL (ref 12–16)
HGB UR QL STRIP: ABNORMAL
HYALINE CASTS #/AREA URNS AUTO: ABNORMAL /HPF (ref 0–5)
KETONES UR STRIP-MCNC: ABNORMAL MG/DL
LEUKOCYTE ESTERASE UR QL STRIP: NEGATIVE
LIPASE SERPL-CCNC: 31 U/L (ref 12–95)
LYMPHOCYTES # BLD: 1.5 K/UL (ref 1–4.8)
LYMPHOCYTES NFR BLD: 11.8 %
MCH RBC QN AUTO: 30.5 PG (ref 27–31.3)
MCHC RBC AUTO-ENTMCNC: 33.3 % (ref 33–37)
MCV RBC AUTO: 91.5 FL (ref 79.4–94.8)
MONOCYTES # BLD: 0.8 K/UL (ref 0.2–0.8)
MONOCYTES NFR BLD: 6.7 %
NEUTROPHILS # BLD: 10 K/UL (ref 1.4–6.5)
NEUTS SEG NFR BLD: 80.1 %
NITRITE UR QL STRIP: NEGATIVE
PH UR STRIP: 8 [PH] (ref 5–9)
PLATELET # BLD AUTO: 279 K/UL (ref 130–400)
POTASSIUM SERPL-SCNC: 4 MEQ/L (ref 3.4–4.9)
PROT SERPL-MCNC: 6.9 G/DL (ref 6.3–8)
PROT UR STRIP-MCNC: NEGATIVE MG/DL
RBC # BLD AUTO: 4.26 M/UL (ref 4.2–5.4)
RBC #/AREA URNS AUTO: ABNORMAL /HPF (ref 0–5)
SODIUM SERPL-SCNC: 141 MEQ/L (ref 135–144)
SP GR UR STRIP: 1.01 (ref 1–1.03)
URINE REFLEX TO CULTURE: ABNORMAL
UROBILINOGEN UR STRIP-ACNC: 0.2 E.U./DL
WBC # BLD AUTO: 12.4 K/UL (ref 4.8–10.8)
WBC #/AREA URNS AUTO: ABNORMAL /HPF (ref 0–5)

## 2024-02-13 PROCEDURE — 99285 EMERGENCY DEPT VISIT HI MDM: CPT

## 2024-02-13 PROCEDURE — 93005 ELECTROCARDIOGRAM TRACING: CPT | Performed by: EMERGENCY MEDICINE

## 2024-02-13 PROCEDURE — 85025 COMPLETE CBC W/AUTO DIFF WBC: CPT

## 2024-02-13 PROCEDURE — 36415 COLL VENOUS BLD VENIPUNCTURE: CPT

## 2024-02-13 PROCEDURE — 83690 ASSAY OF LIPASE: CPT

## 2024-02-13 PROCEDURE — 6360000002 HC RX W HCPCS: Performed by: EMERGENCY MEDICINE

## 2024-02-13 PROCEDURE — 96374 THER/PROPH/DIAG INJ IV PUSH: CPT

## 2024-02-13 PROCEDURE — 80053 COMPREHEN METABOLIC PANEL: CPT

## 2024-02-13 PROCEDURE — 96375 TX/PRO/DX INJ NEW DRUG ADDON: CPT

## 2024-02-13 PROCEDURE — 81001 URINALYSIS AUTO W/SCOPE: CPT

## 2024-02-13 PROCEDURE — 74022 RADEX COMPL AQT ABD SERIES: CPT

## 2024-02-13 PROCEDURE — 82150 ASSAY OF AMYLASE: CPT

## 2024-02-13 RX ORDER — KETOROLAC TROMETHAMINE 15 MG/ML
15 INJECTION, SOLUTION INTRAMUSCULAR; INTRAVENOUS ONCE
Status: COMPLETED | OUTPATIENT
Start: 2024-02-13 | End: 2024-02-13

## 2024-02-13 RX ORDER — ONDANSETRON 2 MG/ML
4 INJECTION INTRAMUSCULAR; INTRAVENOUS ONCE
Status: COMPLETED | OUTPATIENT
Start: 2024-02-13 | End: 2024-02-13

## 2024-02-13 RX ADMIN — ONDANSETRON 4 MG: 2 INJECTION INTRAMUSCULAR; INTRAVENOUS at 10:33

## 2024-02-13 RX ADMIN — KETOROLAC TROMETHAMINE 15 MG: 15 INJECTION, SOLUTION INTRAMUSCULAR; INTRAVENOUS at 10:33

## 2024-02-13 NOTE — ED PROVIDER NOTES
11:04 AM [facility] ED  EMERGENCY DEPARTMENT ENCOUNTER      Pt Name: Jazmin Hutton  MRN: [id number]  Birthdate 1970  Date of evaluation: 2/13/2024  Provider: Eric Carpenter MD    CHIEF COMPLAINT       Chief Complaint   Patient presents with    Abdominal Pain     Right sided abdominal pain since 0500 with nausea         HISTORY OF PRESENT ILLNESS   (Location/Symptom, Timing/Onset, Context/Setting, Quality, Duration, Modifying Factors, Severity)  Note limiting factors.   54 year-old female presenting with diffuse abdominal pain.  Symptoms have been ongoing since 5 AM.  Not made better or worse by anything in particular.  No fevers or hx of abdominal surgeries.  She has not had a bowel movement for several days also notes some nausea.  Has not taken anything for relief prior to arrival.        Nursing Notes were reviewed.    REVIEW OF SYSTEMS    (2-9 systems for level 4, 10 or more for level 5)     Review of Systems   Gastrointestinal:  Positive for abdominal pain and nausea.   All other systems reviewed and are negative.      Except as noted above the remainder of the review of systems was reviewed and negative.       PAST MEDICAL HISTORY     Past Medical History:   Diagnosis Date    Cough     has a history of cough and was given inhaler    Hypertension          SURGICAL HISTORY       Past Surgical History:   Procedure Laterality Date    CARPAL TUNNEL RELEASE Bilateral     COLONOSCOPY N/A 6/8/2021    COLONOSCOPY DIAGNOSTIC performed by Farzad Urrutia MD at [facility]    HYSTERECTOMY (CERVIX STATUS UNKNOWN)      OVARY REMOVAL      TUBAL LIGATION      UPPER GASTROINTESTINAL ENDOSCOPY N/A 10/31/2023    EGD WITH BIOPSIES performed by Buzz Owens MD at [facility]         CURRENT MEDICATIONS       Current Discharge Medication List        CONTINUE these medications which have NOT CHANGED    Details   ASPIRIN LOW DOSE 81 MG EC tablet       FLUoxetine (PROZAC) 20 MG capsule

## 2024-02-13 NOTE — ED TRIAGE NOTES
A & Ox4. Skin pink warm and dry. Points to right side of abdomen for pains. States last BM was yesterday and normal. Complains of nausea but no vomiting. States pain goes through to back. Unable to eat d/t the pain.

## 2024-02-16 LAB
EKG ATRIAL RATE: 70 BPM
EKG P AXIS: 41 DEGREES
EKG P-R INTERVAL: 126 MS
EKG Q-T INTERVAL: 442 MS
EKG QRS DURATION: 94 MS
EKG QTC CALCULATION (BAZETT): 477 MS
EKG R AXIS: 20 DEGREES
EKG T AXIS: 56 DEGREES
EKG VENTRICULAR RATE: 70 BPM

## 2024-02-16 PROCEDURE — 93010 ELECTROCARDIOGRAM REPORT: CPT | Performed by: INTERNAL MEDICINE

## 2024-03-28 ENCOUNTER — APPOINTMENT (OUTPATIENT)
Dept: CARDIOLOGY | Facility: CLINIC | Age: 54
End: 2024-03-28
Payer: COMMERCIAL

## 2024-05-29 ENCOUNTER — APPOINTMENT (OUTPATIENT)
Dept: CARDIOLOGY | Facility: CLINIC | Age: 54
End: 2024-05-29
Payer: COMMERCIAL

## 2024-07-09 ENCOUNTER — APPOINTMENT (OUTPATIENT)
Dept: CARDIOLOGY | Facility: CLINIC | Age: 54
End: 2024-07-09
Payer: COMMERCIAL

## 2024-07-10 ENCOUNTER — APPOINTMENT (OUTPATIENT)
Dept: CARDIOLOGY | Facility: CLINIC | Age: 54
End: 2024-07-10
Payer: COMMERCIAL

## 2024-08-28 ENCOUNTER — APPOINTMENT (OUTPATIENT)
Dept: CARDIOLOGY | Facility: CLINIC | Age: 54
End: 2024-08-28
Payer: COMMERCIAL

## 2024-11-21 ENCOUNTER — PREP FOR PROCEDURE (OUTPATIENT)
Dept: GASTROENTEROLOGY | Age: 54
End: 2024-11-21

## 2024-11-21 ENCOUNTER — OFFICE VISIT (OUTPATIENT)
Dept: GASTROENTEROLOGY | Age: 54
End: 2024-11-21
Payer: COMMERCIAL

## 2024-11-21 VITALS
WEIGHT: 54.88 LBS | HEART RATE: 83 BPM | OXYGEN SATURATION: 95 % | SYSTOLIC BLOOD PRESSURE: 112 MMHG | BODY MASS INDEX: 9.42 KG/M2 | DIASTOLIC BLOOD PRESSURE: 60 MMHG

## 2024-11-21 DIAGNOSIS — R10.13 EPIGASTRIC PAIN: ICD-10-CM

## 2024-11-21 DIAGNOSIS — R10.13 EPIGASTRIC PAIN: Primary | ICD-10-CM

## 2024-11-21 PROCEDURE — 3017F COLORECTAL CA SCREEN DOC REV: CPT | Performed by: NURSE PRACTITIONER

## 2024-11-21 PROCEDURE — 99214 OFFICE O/P EST MOD 30 MIN: CPT | Performed by: NURSE PRACTITIONER

## 2024-11-21 PROCEDURE — G8419 CALC BMI OUT NRM PARAM NOF/U: HCPCS | Performed by: NURSE PRACTITIONER

## 2024-11-21 PROCEDURE — G8484 FLU IMMUNIZE NO ADMIN: HCPCS | Performed by: NURSE PRACTITIONER

## 2024-11-21 PROCEDURE — 1036F TOBACCO NON-USER: CPT | Performed by: NURSE PRACTITIONER

## 2024-11-21 PROCEDURE — G8427 DOCREV CUR MEDS BY ELIG CLIN: HCPCS | Performed by: NURSE PRACTITIONER

## 2024-11-21 RX ORDER — OMEPRAZOLE 40 MG/1
40 CAPSULE, DELAYED RELEASE ORAL 2 TIMES DAILY
Qty: 60 CAPSULE | Refills: 0 | Status: SHIPPED | OUTPATIENT
Start: 2024-11-21 | End: 2024-12-21

## 2024-11-21 NOTE — PROGRESS NOTES
Subjective:      Patient ID: Jazmin Hutton is a 54 y.o. female who presents today for:  Chief Complaint   Patient presents with    Abdominal Pain       HPI  Patient came in as follow-up with chronic complaints of heart burn, with increased heartburn and epigastric pain for approximately 1 month,  at baseline she is on a PPI, no increased NSAIDs, nicotine, caffeine or EtOH.  She does have a history of prior EGD with retained gastric contents and limited visualization, she also has chronic constipation but currently denies any issues with constipation.  No dysphagia, nausea or vomiting, melena, or hematochezia.        OV 11/13/23  Patient came in as follow-up to EGD for heartburn, dysphagia, bloating, and abdominal pain her primary language is Setswana and  was used for this visit.  EGD noted retained gastric contents, otherwise normal, pathology was negative for H. pylori or EOE.  Has persistent dysphagia and heartburn has been on a PPI.  Has persistent nausea and bloating in the setting of constipation, does not follow a regular bowel regimen reports bowel movements approximately once a week and has to strain.  Noted normal colonoscopy in 2021 with diverticulosis.  Denies melena or hematochezia.  OV 10/26/23  Patient came in as follow-up to heartburn, dysphagia and abdominal pain, her primary language is Setswana and an  was used for this visit.  She was seen for previous similar complaints in 2021 with recommendations for upper endoscopy however she felt better and did not follow-up accordingly.  She has been on a PPI.  Her symptoms have gotten worse over the last 9  months, she describes epigastric pain associated with dysphagia to liquids and solids, no trouble initiating swallow, no unintentional weight loss, has reported regurgitation of food, no history of food impaction, no prior EGD.  No melena or hematochezia.  OV 6/10/21  Patient came in as follow-up to colonoscopy, her primary

## 2024-11-22 RX ORDER — SODIUM CHLORIDE 0.9 % (FLUSH) 0.9 %
5-40 SYRINGE (ML) INJECTION EVERY 12 HOURS SCHEDULED
Status: CANCELLED | OUTPATIENT
Start: 2024-11-22

## 2024-11-22 RX ORDER — SODIUM CHLORIDE 0.9 % (FLUSH) 0.9 %
5-40 SYRINGE (ML) INJECTION PRN
Status: CANCELLED | OUTPATIENT
Start: 2024-11-22

## 2024-11-22 RX ORDER — SODIUM CHLORIDE 9 MG/ML
INJECTION, SOLUTION INTRAVENOUS CONTINUOUS
Status: CANCELLED | OUTPATIENT
Start: 2024-11-22

## 2024-11-22 RX ORDER — SODIUM CHLORIDE 9 MG/ML
INJECTION, SOLUTION INTRAVENOUS PRN
Status: CANCELLED | OUTPATIENT
Start: 2024-11-22

## 2024-11-26 NOTE — CONSULTS
Session ID: [id number]  Language: Vietnamese   ID: #[id number]   Name: Breanna
It is important to see your primary physician as well as any specialty physicians within the next week to perform a comprehensive medical review.  Call their offices for an appointment as soon as you leave the hospital.  You will also need to see them for renewal of your medications.  If have any difficulty following with a physician, contact the [facility] [phone] or via [email or web address].   To obtain your results, you can access the [email or web address] Patient Portal at [email or web address].  Your medical issues appear to be stable at this time, but if your symptoms recur or worsen, contact your physicians and/or return to the hospital if necessary.  If you encounter any issues or questions with your medication, call your physicians before stopping the medication.  Do not drive.  Limit your diet to 2 grams of sodium daily.

## 2024-12-20 ENCOUNTER — ANESTHESIA EVENT (OUTPATIENT)
Dept: ENDOSCOPY | Age: 54
End: 2024-12-20
Payer: COMMERCIAL

## 2024-12-20 ENCOUNTER — HOSPITAL ENCOUNTER (OUTPATIENT)
Age: 54
Setting detail: OUTPATIENT SURGERY
Discharge: HOME | End: 2024-12-20
Attending: INTERNAL MEDICINE | Admitting: INTERNAL MEDICINE
Payer: COMMERCIAL

## 2024-12-20 ENCOUNTER — ANESTHESIA (OUTPATIENT)
Dept: ENDOSCOPY | Age: 54
End: 2024-12-20
Payer: COMMERCIAL

## 2024-12-20 VITALS
RESPIRATION RATE: 18 BRPM | SYSTOLIC BLOOD PRESSURE: 104 MMHG | BODY MASS INDEX: 21.34 KG/M2 | TEMPERATURE: 98.8 F | DIASTOLIC BLOOD PRESSURE: 57 MMHG | OXYGEN SATURATION: 98 % | WEIGHT: 125 LBS | HEART RATE: 71 BPM | HEIGHT: 64 IN

## 2024-12-20 DIAGNOSIS — R10.13 EPIGASTRIC PAIN: ICD-10-CM

## 2024-12-20 PROCEDURE — 6360000002 HC RX W HCPCS: Performed by: NURSE ANESTHETIST, CERTIFIED REGISTERED

## 2024-12-20 PROCEDURE — 2500000003 HC RX 250 WO HCPCS: Performed by: INTERNAL MEDICINE

## 2024-12-20 PROCEDURE — 2709999900 HC NON-CHARGEABLE SUPPLY: Performed by: INTERNAL MEDICINE

## 2024-12-20 PROCEDURE — 3609017100 HC EGD: Performed by: INTERNAL MEDICINE

## 2024-12-20 PROCEDURE — 7100000011 HC PHASE II RECOVERY - ADDTL 15 MIN: Performed by: INTERNAL MEDICINE

## 2024-12-20 PROCEDURE — 88342 IMHCHEM/IMCYTCHM 1ST ANTB: CPT

## 2024-12-20 PROCEDURE — 7100000010 HC PHASE II RECOVERY - FIRST 15 MIN: Performed by: INTERNAL MEDICINE

## 2024-12-20 PROCEDURE — 88305 TISSUE EXAM BY PATHOLOGIST: CPT

## 2024-12-20 PROCEDURE — 3700000000 HC ANESTHESIA ATTENDED CARE: Performed by: INTERNAL MEDICINE

## 2024-12-20 PROCEDURE — 43239 EGD BIOPSY SINGLE/MULTIPLE: CPT | Performed by: INTERNAL MEDICINE

## 2024-12-20 RX ORDER — SODIUM CHLORIDE 0.9 % (FLUSH) 0.9 %
5-40 SYRINGE (ML) INJECTION EVERY 12 HOURS SCHEDULED
Status: DISCONTINUED | OUTPATIENT
Start: 2024-12-20 | End: 2024-12-20 | Stop reason: HOSPADM

## 2024-12-20 RX ORDER — SODIUM CHLORIDE 0.9 % (FLUSH) 0.9 %
5-40 SYRINGE (ML) INJECTION PRN
Status: CANCELLED | OUTPATIENT
Start: 2024-12-20

## 2024-12-20 RX ORDER — SODIUM CHLORIDE 9 MG/ML
INJECTION, SOLUTION INTRAVENOUS PRN
Status: CANCELLED | OUTPATIENT
Start: 2024-12-20

## 2024-12-20 RX ORDER — SODIUM CHLORIDE 0.9 % (FLUSH) 0.9 %
5-40 SYRINGE (ML) INJECTION EVERY 12 HOURS SCHEDULED
Status: CANCELLED | OUTPATIENT
Start: 2024-12-20

## 2024-12-20 RX ORDER — SODIUM CHLORIDE 0.9 % (FLUSH) 0.9 %
5-40 SYRINGE (ML) INJECTION PRN
Status: DISCONTINUED | OUTPATIENT
Start: 2024-12-20 | End: 2024-12-20 | Stop reason: HOSPADM

## 2024-12-20 RX ORDER — SODIUM CHLORIDE 9 MG/ML
INJECTION, SOLUTION INTRAVENOUS PRN
Status: DISCONTINUED | OUTPATIENT
Start: 2024-12-20 | End: 2024-12-20 | Stop reason: HOSPADM

## 2024-12-20 RX ORDER — LIDOCAINE HYDROCHLORIDE 20 MG/ML
INJECTION, SOLUTION INFILTRATION; PERINEURAL
Status: DISCONTINUED | OUTPATIENT
Start: 2024-12-20 | End: 2024-12-20 | Stop reason: SDUPTHER

## 2024-12-20 RX ORDER — NALOXONE HYDROCHLORIDE 0.4 MG/ML
INJECTION, SOLUTION INTRAMUSCULAR; INTRAVENOUS; SUBCUTANEOUS PRN
Status: CANCELLED | OUTPATIENT
Start: 2024-12-20

## 2024-12-20 RX ORDER — SODIUM CHLORIDE 9 MG/ML
INJECTION, SOLUTION INTRAVENOUS CONTINUOUS
Status: DISCONTINUED | OUTPATIENT
Start: 2024-12-20 | End: 2024-12-20

## 2024-12-20 RX ORDER — PROPOFOL 10 MG/ML
INJECTION, EMULSION INTRAVENOUS
Status: DISCONTINUED | OUTPATIENT
Start: 2024-12-20 | End: 2024-12-20 | Stop reason: SDUPTHER

## 2024-12-20 RX ADMIN — PROPOFOL 200 MG: 10 INJECTION, EMULSION INTRAVENOUS at 11:12

## 2024-12-20 RX ADMIN — LIDOCAINE HYDROCHLORIDE 60 MG: 20 INJECTION, SOLUTION INFILTRATION; PERINEURAL at 11:12

## 2024-12-20 ASSESSMENT — PAIN - FUNCTIONAL ASSESSMENT
PAIN_FUNCTIONAL_ASSESSMENT: 0-10
PAIN_FUNCTIONAL_ASSESSMENT: 0-10
PAIN_FUNCTIONAL_ASSESSMENT: NONE - DENIES PAIN

## 2024-12-20 NOTE — ANESTHESIA PRE PROCEDURE
Department of Anesthesiology  Preprocedure Note       Name:  Jazmin Hutton   Age:  54 y.o.  :  1970                                          MRN:  [id number]         Date:  2024      Surgeon: Surgeon(s):  Farzad Urrutia MD    Procedure: Procedure(s):  ESOPHAGOGASTRODUODENOSCOPY    Medications prior to admission:   Prior to Admission medications    Medication Sig Start Date End Date Taking? Authorizing Provider   omeprazole (PRILOSEC) 40 MG delayed release capsule Take 1 capsule by mouth in the morning and 1 capsule in the evening. 24  Basilia Renteria, APRN - CNP   ASPIRIN LOW DOSE 81 MG EC tablet  10/5/23   ProviderTerrie MD   FLUoxetine (PROZAC) 20 MG capsule  10/19/23   ProviderTerrie MD   paliperidone (INVEGA) 9 MG extended release tablet  10/7/23   ProviderTerrie MD   divalproex (DEPAKOTE ER) 250 MG extended release tablet  10/7/23   ProviderTerrie MD   divalproex (DEPAKOTE ER) 500 MG extended release tablet  10/11/23   ProviderTerrie MD   benztropine (COGENTIN) 1 MG tablet  10/24/23   ProviderTerrie MD   meloxicam (MOBIC) 7.5 MG tablet Take 1 tablet by mouth daily 10/26/22   Kodak Chaudhry PA-C   risperiDONE (RISPERDAL) 0.5 MG tablet Take 1 tablet by mouth 2 times daily    ProviderTerrie MD   traZODone (DESYREL) 50 MG tablet Take 1 tablet by mouth nightly    Provider, MD Terrie   cyclobenzaprine (FLEXERIL) 10 MG tablet  21   ProviderTerrie MD   lisinopril (PRINIVIL;ZESTRIL) 5 MG tablet  21   ProviderTerrie MD   gabapentin (NEURONTIN) 800 MG tablet  21   Terrie Hubbard MD   divalproex (DEPAKOTE) 500 MG DR tablet  21   Terrie Hubbard MD   buPROPion (WELLBUTRIN XL) 150 MG extended release tablet  21   ProviderTerrie MD       Current medications:    No current facility-administered medications for this encounter.     Current Outpatient Medications   Medication Sig

## 2024-12-20 NOTE — ANESTHESIA POSTPROCEDURE EVALUATION
Department of Anesthesiology  Postprocedure Note    Patient: Jazmin Hutton  MRN: [id number]  YOB: 1970  Date of evaluation: 12/20/2024    Procedure Summary       Date: 12/20/24 Room / Location: [facility] OR 02 / [facility]    Anesthesia Start: 1108 Anesthesia Stop:     Procedure: ESOPHAGOGASTRODUODENOSCOPY WITH BIOPSY Diagnosis:       Epigastric pain      (Epigastric pain [R10.13])    Surgeons: Farzad Urrutia MD Responsible Provider: Ceci Ybarra APRN - CRNA    Anesthesia Type: MAC ASA Status: 2            Anesthesia Type: No value filed.    Elida Phase I: Elida Score: 10    Elida Phase II:      Anesthesia Post Evaluation    Patient location during evaluation: bedside  Patient participation: complete - patient participated  Level of consciousness: awake and awake and alert  Pain score: 0  Airway patency: patent  Nausea & Vomiting: no nausea and no vomiting  Cardiovascular status: blood pressure returned to baseline and hemodynamically stable  Respiratory status: acceptable and spontaneous ventilation  Hydration status: euvolemic  Pain management: adequate        No notable events documented.

## 2024-12-20 NOTE — H&P
Patient Name: Jazmin Hutton  : 1970  MRN: [id number]  DATE: 24      ENDOSCOPY  History and Physical    Procedure:    [] Diagnostic Colonoscopy       [] Screening Colonoscopy  [x] EGD      [] ERCP      [] EUS       [] Other    [x] Previous office notes/History and Physical reviewed from the patients chart. Please see EMR for further details of HPI. I have examined the patient's status immediately prior to the procedure and:      Indications/HPI:    [x]Abdominal Pain   []Cancer- GI/Lung  []Fhx of colon CA/polyps  []History of Polyps   []Carlton’s   []Melena  []Abnormal Imaging   []Dysphagia    []Persistent Pneumonia  []Anemia   []Food Impaction  []History of Polyps  []GI Bleed   []Pulmonary nodule/Mass  []Change in bowel habits  [x]Heartburn/Reflux  []Rectal Bleed (BRBPR)  []Chest Pain - Non Cardiac  []Heme (+) Stool  []Ulcers  []Constipation   []Hemoptysis   []Varices  []Diarrhea   []Hypoxemia  []Nausea/Vomiting   []Screening   []Crohns/Colitis  []Other:    Anesthesia:   [x] MAC [] Moderate Sedation   [] General   [] None     ROS: 12 pt Review of Symptoms was negative unless mentioned above    Medications:   Prior to Admission medications    Medication Sig Start Date End Date Taking? Authorizing Provider   omeprazole (PRILOSEC) 40 MG delayed release capsule Take 1 capsule by mouth in the morning and 1 capsule in the evening. 24 Yes Basilia Renteria, APRN - CNP   ASPIRIN LOW DOSE 81 MG EC tablet  10/5/23  Yes Provider, Historical, MD   FLUoxetine (PROZAC) 20 MG capsule daily 10/19/23  Yes Provider, Historical, MD   divalproex (DEPAKOTE ER) 250 MG extended release tablet  10/7/23  Yes Provider, Historical, MD   divalproex (DEPAKOTE ER) 500 MG extended release tablet  10/11/23  Yes Provider, Historical, MD   benztropine (COGENTIN) 1 MG tablet  10/24/23  Yes Provider, Historical, MD   meloxicam (MOBIC) 7.5 MG tablet Take 1 tablet by mouth daily 10/26/22  Yes Kodak Chaudhry PA-C    risperiDONE (RISPERDAL) 0.5 MG tablet Take 1 tablet by mouth 2 times daily   Yes Terrie Hubbard MD   cyclobenzaprine (FLEXERIL) 10 MG tablet daily 5/14/21  Yes Terrie Hubbard MD   lisinopril (PRINIVIL;ZESTRIL) 5 MG tablet daily 5/11/21  Yes ProviderTerrie MD   gabapentin (NEURONTIN) 800 MG tablet  4/24/21  Yes Terrie Hubbard MD   divalproex (DEPAKOTE) 500 MG DR tablet  4/27/21  Yes Terrie Hubbard MD   buPROPion (WELLBUTRIN XL) 150 MG extended release tablet  4/27/21  Yes Terrie Hubbard MD   paliperidone (INVEGA) 9 MG extended release tablet  10/7/23   Terrie Hubbard MD   traZODone (DESYREL) 50 MG tablet Take 1 tablet by mouth nightly  Patient not taking: Reported on 12/20/2024    Terrie Hubbard MD       Allergies: No Known Allergies     History of allergic reaction to anesthesia:  No    Past Medical History:  Past Medical History:   Diagnosis Date    Cough     has a history of cough and was given inhaler    Hypertension        Past Surgical History:  Past Surgical History:   Procedure Laterality Date    CARPAL TUNNEL RELEASE Bilateral     COLONOSCOPY N/A 6/8/2021    COLONOSCOPY DIAGNOSTIC performed by Farzad Urrutia MD at [facility]    HYSTERECTOMY (CERVIX STATUS UNKNOWN)      OVARY REMOVAL      TUBAL LIGATION      UPPER GASTROINTESTINAL ENDOSCOPY N/A 10/31/2023    EGD WITH BIOPSIES performed by Buzz Owens MD at [facility]       Social History:  Social History     Tobacco Use    Smoking status: Never    Smokeless tobacco: Never   Vaping Use    Vaping status: Never Used   Substance Use Topics    Alcohol use: Not Currently    Drug use: Not Currently     Comment: last use sunday 5/23/21       Vital Signs:   Vitals:    12/20/24 1017   BP: 96/61   Pulse: 77   Resp: 18   Temp: 98.8 °F (37.1 °C)   SpO2: 96%        Physical Exam:  Cardiac:  [x]WNL  []Comments:  Pulmonary:  [x]WNL   []Comments:   Neuro/Mental Status:  [x]WNL

## (undated) DEVICE — BRUSH ENDO CLN L90.5IN SHTH DIA1.7MM BRIST DIA5-7MM 2-6MM

## (undated) DEVICE — Device: Brand: ENDO SMARTCAP

## (undated) DEVICE — ENDO CARRY-ON PROCEDURE KIT: Brand: ENDO CARRY-ON PROCEDURE KIT

## (undated) DEVICE — COVER DUST PERIMETER HUMPREY

## (undated) DEVICE — TUBING IRRIGATION 140/160/180/190 SER GI ENDOSCP SMARTCAP

## (undated) DEVICE — TUBE SET 96 MM 64 MM H2O PERISTALTIC STD AUX CHANNEL

## (undated) DEVICE — TUBING, SUCTION, 1/4" X 10', STRAIGHT: Brand: MEDLINE

## (undated) DEVICE — SINGLE PORT MANIFOLD: Brand: NEPTUNE 2

## (undated) DEVICE — GLOVE ORANGE PI 8 1/2   MSG9085

## (undated) DEVICE — CONMED SCOPE SAVER BITE BLOCK, 20X27 MM: Brand: SCOPE SAVER

## (undated) DEVICE — GLOVE ORTHO 8   MSG9480

## (undated) DEVICE — ADAPTER FLSH PMP FLD MGMT GI IRRIG OFP 2 DISPOSABLE

## (undated) DEVICE — FORCEPS BX L240CM JAW DIA2.8MM L CAP W/ NDL MIC MESH TOOTH